# Patient Record
Sex: FEMALE | Race: WHITE | Employment: PART TIME | URBAN - METROPOLITAN AREA
[De-identification: names, ages, dates, MRNs, and addresses within clinical notes are randomized per-mention and may not be internally consistent; named-entity substitution may affect disease eponyms.]

---

## 2017-06-10 ENCOUNTER — HOSPITAL ENCOUNTER (EMERGENCY)
Facility: CLINIC | Age: 22
Discharge: HOME | End: 2017-06-10
Attending: FAMILY MEDICINE | Admitting: FAMILY MEDICINE
Payer: COMMERCIAL

## 2017-06-10 VITALS
RESPIRATION RATE: 16 BRPM | TEMPERATURE: 97.9 F | BODY MASS INDEX: 24.32 KG/M2 | DIASTOLIC BLOOD PRESSURE: 78 MMHG | HEART RATE: 70 BPM | OXYGEN SATURATION: 99 % | WEIGHT: 145 LBS | SYSTOLIC BLOOD PRESSURE: 117 MMHG

## 2017-06-10 DIAGNOSIS — K20.80 ESOPHAGITIS DUE TO DOXYCYCLINE: ICD-10-CM

## 2017-06-10 DIAGNOSIS — T36.4X5A ESOPHAGITIS DUE TO DOXYCYCLINE: ICD-10-CM

## 2017-06-10 PROCEDURE — 99283 EMERGENCY DEPT VISIT LOW MDM: CPT

## 2017-06-10 PROCEDURE — 99283 EMERGENCY DEPT VISIT LOW MDM: CPT | Performed by: FAMILY MEDICINE

## 2017-06-10 NOTE — ED PROVIDER NOTES
"  History     Chief Complaint   Patient presents with     Gastrophageal Reflux     right sided chest pain and neck pain after eating and drinking. Started last night, and today. Worse with eating, no nausea or vomiting.      HPI  Anna Hosler is a 21 year old female who presents with burning discomfort that started awaiting radiating up to the neck.  It is aggravated by eating especially acidic foods.  It began about 2 days ago when she woke up in the morning.  The night before she had taken her last dose of doxycycline that she was taking for a \"cold.\"  She had swallowed the pill without liquids and immediately laid down to sleep.  She woke up with pain in the next morning.  She has not had a fever.  She has a little bit of a cough that she had had previously from her cold is getting better.  She's had no vomiting.  She has no abdominal pain.  No bowel or bladder dysfunction.  She doesn't smoke.  She does not consume alcohol.  Currently she otherwise takes no medications.    I have reviewed the Medications, Allergies, Past Medical and Surgical History, and Social History in the Epic system.    Allergies:   Allergies   Allergen Reactions     Amoxicillin Unknown     Neomycin-Polymyxin-Hc Swelling         No current facility-administered medications on file prior to encounter.   Current Outpatient Prescriptions on File Prior to Encounter:  polyethylene glycol (MIRALAX) powder Take 17 g by mouth daily       Patient Active Problem List   Diagnosis     Irritable bowel syndrome     Constipation, chronic       No past surgical history on file.    Social History   Substance Use Topics     Smoking status: Never Smoker     Smokeless tobacco: Never Used     Alcohol use No         There is no immunization history on file for this patient.    BMI: Estimated body mass index is 24.32 kg/(m^2) as calculated from the following:    Height as of 11/12/13: 1.645 m (5' 4.75\").    Weight as of this encounter: 65.8 kg (145 lb).      Review " of Systems  Further problem focused system review negative.    Physical Exam   BP: 117/78  Pulse: 70  Temp: 97.9  F (36.6  C)  Resp: 16  Weight: 65.8 kg (145 lb)  SpO2: 99 %  Physical Exam  Nursing note and vitals were reviewed.  Constitutional: Awake and alert, healthy appearing 21-year-old in no acute distress, who does not appear acutely ill, and who answers questions appropriately and cooperates with examination.  HEENT: EOMI. Oropharynx normal.  Neck: Freely mobile.  Cardiovascular: Cardiac examination reveals normal heart rate and regular rhythm without murmur.  Pulmonary/Chest: Breathing is unlabored.  Breath sounds are clear and equal bilaterally.  There no retractions, tachypnea, rales, wheezes, or rhonchi.  Abdomen: Soft, nontender, no HSM or masses rebound or guarding.  Neurological: Alert, oriented, thought content logical, coherent   Skin: Warm, dry, no rashes.  Psychiatric: Affect broad and appropriate.        ED Course     ED Course     Procedures             Critical Care time:  none               Labs Ordered and Resulted from Time of ED Arrival Up to the Time of Departure from the ED - No data to display    Assessments & Plan (with Medical Decision Making)     21-year-old presents with burning substernal discomfort in the setting of doxycycline use coming on the morning after swallowing the pill without liquids and laying down immediately.  Her history is consistent with doxycycline induced esophagitis.  Her physical examination is reassuring.  At this time I recommend treatment with proton pump inhibitors and observation.  If symptoms fail to resolve or new concerning symptoms develop, the workup can be broadened but her history is typical and I suspect she will have rapid improvement with avoidance of irritants and use of acid suppression.    I have reviewed the nursing notes.    I have reviewed the findings, diagnosis, plan and need for follow up with the patient.       New Prescriptions     OMEPRAZOLE (PRILOSEC) 20 MG CR CAPSULE    Take 1 capsule (20 mg) by mouth 2 times daily for 7 days       Final diagnoses:   Esophagitis due to doxycycline       6/10/2017   [facility] EMERGENCY DEPARTMENT     Thomas Tolentino MD  06/10/17 1236

## 2017-06-10 NOTE — DISCHARGE INSTRUCTIONS
Avoid acidic foods.. Avoid alcohol.  Take omeprazole 20 mg twice daily for up to 7 days until symptoms resolve.  Return to be seen if not improved in 12-24 hours or if new or worsening symptoms including fevers or vomiting.

## 2017-06-10 NOTE — ED AVS SNAPSHOT
[facility] Emergency Department    [address] [address]    Phone:  [phone]    Fax:  [phone]                                       Anna Hosler   MRN: [id number]    Department:  [facility] Emergency Department   Date of Visit:  6/10/2017           After Visit Summary Signature Page     I have received my discharge instructions, and my questions have been answered. I have discussed any challenges I see with this plan with the nurse or doctor.    ..........................................................................................................................................  Patient/Patient Representative Signature      ..........................................................................................................................................  Patient Representative Print Name and Relationship to Patient    ..................................................               ................................................  Date                                            Time    ..........................................................................................................................................  Reviewed by Signature/Title    ...................................................              ..............................................  Date                                                            Time

## 2017-06-10 NOTE — ED AVS SNAPSHOT
[facility] Emergency Department    [address] [address]    Phone:  [phone]    Fax:  [phone]                                       Anna Hosler   MRN: [id number]    Department:  [facility] Emergency Department   Date of Visit:  6/10/2017           Patient Information     Date Of Birth          1995        Your diagnoses for this visit were:     Esophagitis due to doxycycline        You were seen by Thomas Tolentino MD.        Discharge Instructions       Avoid acidic foods.. Avoid alcohol.  Take omeprazole 20 mg twice daily for up to 7 days until symptoms resolve.  Return to be seen if not improved in 12-24 hours or if new or worsening symptoms including fevers or vomiting.    24 Hour Appointment Hotline       To make an appointment at any [facility] clinic, call [phone] ([phone]). If you don't have a family doctor or clinic, we will help you find one. [facility] clinics are conveniently located to serve the needs of you and your family.             Review of your medicines      START taking        Dose / Directions Last dose taken    omeprazole 20 MG CR capsule   Commonly known as:  priLOSEC   Dose:  20 mg   Quantity:  14 capsule        Take 1 capsule (20 mg) by mouth 2 times daily for 7 days   Refills:  0          Our records show that you are taking the medicines listed below. If these are incorrect, please call your family doctor or clinic.        Dose / Directions Last dose taken    polyethylene glycol powder   Commonly known as:  MIRALAX   Dose:  1 capful   Quantity:  119 g        Take 17 g by mouth daily   Refills:  3                Prescriptions were sent or printed at these locations (1 Prescription)                   [facility] [address] [place], [place] [address] [facility] AT [address]   [address] [address]    Telephone:  [phone]   Fax:  [phone]   Hours:                  E-Prescribed (1 of 1)  "        omeprazole (PRILOSEC) 20 MG CR capsule                Orders Needing Specimen Collection     None      Pending Results     No orders found from 2017 to 2017.            Pending Culture Results     No orders found from 2017 to 2017.            Pending Results Instructions     If you had any lab results that were not finalized at the time of your Discharge, you can call the ED Lab Result RN at [phone]. You will be contacted by this team for any positive Lab results or changes in treatment. The nurses are available 7 days a week from 10A to 6:30P.  You can leave a message 24 hours per day and they will return your call.        Test Results From Your Hospital Stay               Thank you for choosing [facility]       Thank you for choosing [facility] for your care. Our goal is always to provide you with excellent care. Hearing back from our patients is one way we can continue to improve our services. Please take a few minutes to complete the written survey that you may receive in the mail after you visit with us. Thank you!        [employer] Information     [employer] lets you send messages to your doctor, view your test results, renew your prescriptions, schedule appointments and more. To sign up, go to [email or web address] . Click on \"Log in\" on the left side of the screen, which will take you to the Welcome page. Then click on \"Sign up Now\" on the right side of the page.     You will be asked to enter the access code listed below, as well as some personal information. Please follow the directions to create your username and password.     Your access code is: [email or web address]  Expires: 2017  1:18 PM     Your access code will  in 90 days. If you need help or a new code, please call your [facility] clinic or [phone].        Care EveryWhere ID     This is your Care EveryWhere ID. This could be used by other organizations to access your [facility] medical records  [id number]        After " Visit Summary       This is your record. Keep this with you and show to your community pharmacist(s) and doctor(s) at your next visit.

## 2017-11-20 ENCOUNTER — APPOINTMENT (OUTPATIENT)
Dept: ULTRASOUND IMAGING | Facility: CLINIC | Age: 22
End: 2017-11-20
Attending: EMERGENCY MEDICINE
Payer: COMMERCIAL

## 2017-11-20 ENCOUNTER — HOSPITAL ENCOUNTER (EMERGENCY)
Facility: CLINIC | Age: 22
Discharge: HOME | End: 2017-11-20
Attending: EMERGENCY MEDICINE | Admitting: EMERGENCY MEDICINE
Payer: COMMERCIAL

## 2017-11-20 VITALS
SYSTOLIC BLOOD PRESSURE: 111 MMHG | TEMPERATURE: 99.6 F | RESPIRATION RATE: 16 BRPM | DIASTOLIC BLOOD PRESSURE: 78 MMHG | OXYGEN SATURATION: 98 % | HEART RATE: 72 BPM

## 2017-11-20 DIAGNOSIS — R10.9 FLANK PAIN: ICD-10-CM

## 2017-11-20 LAB
ALBUMIN UR-MCNC: NEGATIVE MG/DL
ANION GAP SERPL CALCULATED.3IONS-SCNC: 10 MMOL/L (ref 3–14)
APPEARANCE UR: CLEAR
BACTERIA #/AREA URNS HPF: ABNORMAL /HPF
BASOPHILS # BLD AUTO: 0 10E9/L (ref 0–0.2)
BASOPHILS NFR BLD AUTO: 0.1 %
BILIRUB UR QL STRIP: NEGATIVE
BUN SERPL-MCNC: 7 MG/DL (ref 7–30)
CALCIUM SERPL-MCNC: 8.9 MG/DL (ref 8.5–10.1)
CHLORIDE SERPL-SCNC: 108 MMOL/L (ref 94–109)
CO2 SERPL-SCNC: 22 MMOL/L (ref 20–32)
COLOR UR AUTO: ABNORMAL
CREAT SERPL-MCNC: 0.5 MG/DL (ref 0.52–1.04)
DIFFERENTIAL METHOD BLD: NORMAL
EOSINOPHIL # BLD AUTO: 0.1 10E9/L (ref 0–0.7)
EOSINOPHIL NFR BLD AUTO: 1 %
ERYTHROCYTE [DISTWIDTH] IN BLOOD BY AUTOMATED COUNT: 13.7 % (ref 10–15)
GFR SERPL CREATININE-BSD FRML MDRD: >90 ML/MIN/1.7M2
GLUCOSE SERPL-MCNC: 95 MG/DL (ref 70–99)
GLUCOSE UR STRIP-MCNC: NEGATIVE MG/DL
HCT VFR BLD AUTO: 35.9 % (ref 35–47)
HGB BLD-MCNC: 12.2 G/DL (ref 11.7–15.7)
HGB UR QL STRIP: NEGATIVE
IMM GRANULOCYTES # BLD: 0 10E9/L (ref 0–0.4)
IMM GRANULOCYTES NFR BLD: 0.2 %
KETONES UR STRIP-MCNC: NEGATIVE MG/DL
LEUKOCYTE ESTERASE UR QL STRIP: NEGATIVE
LYMPHOCYTES # BLD AUTO: 1.6 10E9/L (ref 0.8–5.3)
LYMPHOCYTES NFR BLD AUTO: 18.5 %
MCH RBC QN AUTO: 31.9 PG (ref 26.5–33)
MCHC RBC AUTO-ENTMCNC: 34 G/DL (ref 31.5–36.5)
MCV RBC AUTO: 94 FL (ref 78–100)
MONOCYTES # BLD AUTO: 0.6 10E9/L (ref 0–1.3)
MONOCYTES NFR BLD AUTO: 6.4 %
MUCOUS THREADS #/AREA URNS LPF: PRESENT /LPF
NEUTROPHILS # BLD AUTO: 6.4 10E9/L (ref 1.6–8.3)
NEUTROPHILS NFR BLD AUTO: 73.8 %
NITRATE UR QL: NEGATIVE
PH UR STRIP: 6.5 PH (ref 5–7)
PLATELET # BLD AUTO: 151 10E9/L (ref 150–450)
POTASSIUM SERPL-SCNC: 3.4 MMOL/L (ref 3.4–5.3)
RBC # BLD AUTO: 3.82 10E12/L (ref 3.8–5.2)
RBC #/AREA URNS AUTO: 1 /HPF (ref 0–2)
SODIUM SERPL-SCNC: 140 MMOL/L (ref 133–144)
SOURCE: ABNORMAL
SP GR UR STRIP: 1 (ref 1–1.03)
UROBILINOGEN UR STRIP-MCNC: NORMAL MG/DL (ref 0–2)
WBC # BLD AUTO: 8.7 10E9/L (ref 4–11)
WBC #/AREA URNS AUTO: 1 /HPF (ref 0–2)

## 2017-11-20 PROCEDURE — 80048 BASIC METABOLIC PNL TOTAL CA: CPT | Performed by: EMERGENCY MEDICINE

## 2017-11-20 PROCEDURE — 36415 COLL VENOUS BLD VENIPUNCTURE: CPT

## 2017-11-20 PROCEDURE — 76770 US EXAM ABDO BACK WALL COMP: CPT

## 2017-11-20 PROCEDURE — 99284 EMERGENCY DEPT VISIT MOD MDM: CPT | Mod: 25

## 2017-11-20 PROCEDURE — 99284 EMERGENCY DEPT VISIT MOD MDM: CPT | Mod: Z6 | Performed by: EMERGENCY MEDICINE

## 2017-11-20 PROCEDURE — 85025 COMPLETE CBC W/AUTO DIFF WBC: CPT | Performed by: EMERGENCY MEDICINE

## 2017-11-20 PROCEDURE — 81001 URINALYSIS AUTO W/SCOPE: CPT | Performed by: EMERGENCY MEDICINE

## 2017-11-20 RX ORDER — ONDANSETRON 4 MG/1
4 TABLET, ORALLY DISINTEGRATING ORAL EVERY 6 HOURS PRN
Qty: 10 TABLET | Refills: 0 | Status: SHIPPED | OUTPATIENT
Start: 2017-11-20 | End: 2017-11-23

## 2017-11-20 NOTE — ED AVS SNAPSHOT
[facility] Emergency Department    [address] [facility] [address]    Phone:  [phone]    Fax:  [phone]                                       Anna Hosler   MRN: [id number]    Department:  [facility] Emergency Department   Date of Visit:  11/20/2017           After Visit Summary Signature Page     I have received my discharge instructions, and my questions have been answered. I have discussed any challenges I see with this plan with the nurse or doctor.    ..........................................................................................................................................  Patient/Patient Representative Signature      ..........................................................................................................................................  Patient Representative Print Name and Relationship to Patient    ..................................................               ................................................  Date                                            Time    ..........................................................................................................................................  Reviewed by Signature/Title    ...................................................              ..............................................  Date                                                            Time

## 2017-11-20 NOTE — ED NOTES
Pt was rolling over this morning in bed and got a sharp, shooting pain in her mid back. Pt states that the pain went away after voiding but gradually builds back up until she voids again. Pt states there is also some burning with urination. Pt denies nausea, vomiting or diarrhea.

## 2017-11-20 NOTE — ED AVS SNAPSHOT
[facility] Emergency Department    [address] [address]    Phone:  [phone]    Fax:  [phone]                                       Anna Hosler   MRN: [id number]    Department:  [facility] Emergency Department   Date of Visit:  11/20/2017           Patient Information     Date Of Birth          1995        Your diagnoses for this visit were:     Flank pain        You were seen by Andre Pickens MD.      Follow-up Information     Follow up with [facility].    Specialty:  Urology    Contact information:    [facility]  [facility] [address] [phone]    Additional information:    The medical center is located at   [address] (between [address] in [place], [place] miles north   of [place]).        Discharge Instructions         Flank Pain, Uncertain Cause  The flank is the area between your upper abdomen and your back. Pain there is often caused by a problem with your kidneys. It might be a kidney infection or a kidney stone. Other causes of flank pain include spinal arthritis, a pinched nerve from a back injury, or a back muscle strain or spasm.  The cause of your flank pain is not certain. You may need other tests.  Home care  Follow these tips when caring for yourself at home:    You may use acetaminophen or ibuprofen to control pain, unless your health care provider prescribed another medicine. If you have chronic liver or kidney disease, talk with your provider before taking these medicines. Also talk with your provider first if you ve ever had a stomach ulcer or GI bleeding.    If the pain is coming from your muscles, you may get relief with ice or heat. During the first 2 days after the injury, put an ice pack on the painful area for 20 minutes every 2 to 4 hours. This will reduce swelling and pain. A hot shower, hot bath, or heating pad works well for a muscle spasm. You can start with ice, then switch to  heat after 2 days. You might find that alternating ice and heat works well. Use the method that feels the best to you.  Follow-up care  Follow up with your healthcare provider if your symptoms don t get better over the next few days.  When to seek medical advice  Call your healthcare provider right away if any of these happen:    Repeated vomiting    Fever of 100.4 F (38 C) or higher, or as directed by your health care provider    Flank pain that gets worse    Pain that spreads to the front of your belly (abdomen)    Dizziness, weakness, or fainting    Blood in your urine    Burning feeling when you urinate or the need to urinate often    Pain in one of your legs that gets worse    Numbness or weakness in a leg  Date Last Reviewed: 10/1/2016    1786-8905 [employer]. [address] [place] [address], [place] [address]. All rights reserved. This information is not intended as a substitute for professional medical care. Always follow your healthcare professional's instructions.          Discharge References/Attachments     KIDNEY STONES, TREATING: EXPECTANT THERAPY (ENGLISH)      24 Hour Appointment Hotline       To make an appointment at any [facility], call [phone]). If you don't have a family doctor or clinic, we will help you find one. [facility] clinics are conveniently located to serve the needs of you and your family.             Review of your medicines      START taking        Dose / Directions Last dose taken    ondansetron 4 MG ODT tab   Commonly known as:  ZOFRAN ODT   Dose:  4 mg   Quantity:  10 tablet        Take 1 tablet (4 mg) by mouth every 6 hours as needed for nausea   Refills:  0          Our records show that you are taking the medicines listed below. If these are incorrect, please call your family doctor or clinic.        Dose / Directions Last dose taken    polyethylene glycol powder   Commonly known as:  MIRALAX   Dose:  1 capful   Quantity:  119 g        Take 17 g  by mouth daily   Refills:  3                Prescriptions were sent or printed at these locations (1 Prescription)                   Other Prescriptions                Printed at Department/Unit printer (1 of 1)         ondansetron (ZOFRAN ODT) 4 MG ODT tab                Procedures and tests performed during your visit     Basic metabolic panel    CBC with platelets, differential    Retroperitoneal US    UA with Microscopic reflex to Culture      Orders Needing Specimen Collection     None      Pending Results     Date and Time Order Name Status Description    11/20/2017 1737 Retroperitoneal US Preliminary             Pending Culture Results     No orders found from 11/18/2017 to 11/21/2017.            Pending Results Instructions     If you had any lab results that were not finalized at the time of your Discharge, you can call the ED Lab Result RN at [phone]. You will be contacted by this team for any positive Lab results or changes in treatment. The nurses are available 7 days a week from 10A to 6:30P.  You can leave a message 24 hours per day and they will return your call.        Test Results From Your Hospital Stay        11/20/2017  4:54 PM      Component Results     Component Value Ref Range & Units Status    Color Urine Straw  Final    Appearance Urine Clear  Final    Glucose Urine Negative NEG^Negative mg/dL Final    Bilirubin Urine Negative NEG^Negative Final    Ketones Urine Negative NEG^Negative mg/dL Final    Specific Gravity Urine 1.003 1.003 - 1.035 Final    Blood Urine Negative NEG^Negative Final    pH Urine 6.5 5.0 - 7.0 pH Final    Protein Albumin Urine Negative NEG^Negative mg/dL Final    Urobilinogen mg/dL Normal 0.0 - 2.0 mg/dL Final    Nitrite Urine Negative NEG^Negative Final    Leukocyte Esterase Urine Negative NEG^Negative Final    Source Midstream Urine  Final    WBC Urine 1 0 - 2 /HPF Final    RBC Urine 1 0 - 2 /HPF Final    Bacteria Urine Few (A) NEG^Negative /HPF Final    Mucous  Urine Present (A) NEG^Negative /LPF Final         11/20/2017  5:29 PM      Component Results     Component Value Ref Range & Units Status    WBC 8.7 4.0 - 11.0 10e9/L Final    RBC Count 3.82 3.8 - 5.2 10e12/L Final    Hemoglobin 12.2 11.7 - 15.7 g/dL Final    Hematocrit 35.9 35.0 - 47.0 % Final    MCV 94 78 - 100 fl Final    MCH 31.9 26.5 - 33.0 pg Final    MCHC 34.0 31.5 - 36.5 g/dL Final    RDW 13.7 10.0 - 15.0 % Final    Platelet Count 151 150 - 450 10e9/L Final    Diff Method Automated Method  Final    % Neutrophils 73.8 % Final    % Lymphocytes 18.5 % Final    % Monocytes 6.4 % Final    % Eosinophils 1.0 % Final    % Basophils 0.1 % Final    % Immature Granulocytes 0.2 % Final    Absolute Neutrophil 6.4 1.6 - 8.3 10e9/L Final    Absolute Lymphocytes 1.6 0.8 - 5.3 10e9/L Final    Absolute Monocytes 0.6 0.0 - 1.3 10e9/L Final    Absolute Eosinophils 0.1 0.0 - 0.7 10e9/L Final    Absolute Basophils 0.0 0.0 - 0.2 10e9/L Final    Abs Immature Granulocytes 0.0 0 - 0.4 10e9/L Final         11/20/2017  5:44 PM      Component Results     Component Value Ref Range & Units Status    Sodium 140 133 - 144 mmol/L Final    Potassium 3.4 3.4 - 5.3 mmol/L Final    Chloride 108 94 - 109 mmol/L Final    Carbon Dioxide 22 20 - 32 mmol/L Final    Anion Gap 10 3 - 14 mmol/L Final    Glucose 95 70 - 99 mg/dL Final    Urea Nitrogen 7 7 - 30 mg/dL Final    Creatinine 0.50 (L) 0.52 - 1.04 mg/dL Final    GFR Estimate >90 >60 mL/min/1.7m2 Final    Non  GFR Calc    GFR Estimate If Black >90 >60 mL/min/1.7m2 Final    African American GFR Calc    Calcium 8.9 8.5 - 10.1 mg/dL Final         11/20/2017  6:36 PM      Narrative     ULTRASOUND RETROPERITONEAL COMPLETE   11/20/2017  6:30 PM     HISTORY: 18 weeks pregnant. Left-sided flank pain with urinary  frequency.    COMPARISON: None.    FINDINGS: The right and left kidneys have normal size and  echogenicity, measuring 11.5 and 12.4 cm in length respectively. Mild  to moderate  "dilatation of the left intrarenal collecting system. No  dilatation of the right intrarenal collecting system. No renal masses  or calculi are visualized bilaterally. The urinary bladder contains a  small amount of urine and is otherwise unremarkable. Partial  visualization of a gravid uterus.        Impression     IMPRESSION:   1. Mild to moderate dilatation of the left intrarenal collecting  system.  2. Unremarkable appearance of the right kidney.                Thank you for choosing [facility]       Thank you for choosing [facility] for your care. Our goal is always to provide you with excellent care. Hearing back from our patients is one way we can continue to improve our services. Please take a few minutes to complete the written survey that you may receive in the mail after you visit with us. Thank you!        [employer] Information     [employer] lets you send messages to your doctor, view your test results, renew your prescriptions, schedule appointments and more. To sign up, go to [email or web address] . Click on \"Log in\" on the left side of the screen, which will take you to the Welcome page. Then click on \"Sign up Now\" on the right side of the page.     You will be asked to enter the access code listed below, as well as some personal information. Please follow the directions to create your username and password.     Your access code is: [email or web address]  Expires: 2018  6:50 PM     Your access code will  in 90 days. If you need help or a new code, please call your [facility] clinic or [phone].        Care EveryWhere ID     This is your Care EveryWhere ID. This could be used by other organizations to access your [facility] medical records  [id number]        Equal Access to Services     [facility] HospitalSTACIE : Brenda Will, washeila dugan, qaybta kalane dailey . So [facility] [phone].    ATENCIÓN: Si habla español, tiene a ludwig disposición servicios " nash de asistencia lingüística. Tai carrizales [phone].    We comply with applicable federal civil rights laws and [place] laws. We do not discriminate on the basis of race, color, national origin, age, disability, sex, sexual orientation, or gender identity.            After Visit Summary       This is your record. Keep this with you and show to your community pharmacist(s) and doctor(s) at your next visit.

## 2017-11-20 NOTE — ED PROVIDER NOTES
History     Chief Complaint   Patient presents with     Back Pain     had 2 episodes of back pain today, both relieved by going to the bathroom     HPI  Anna Hosler is a 22 year old female who has a history of chronic constipation and IBS who presents to the ED for evaluation for back pain. Patient is , and she is currently at 18 weeks gestation. Patient reports that when she woke up this morning and rolled over, she had pain in her lower back radiating bilaterally into the flanks. Patient urinated, and this pain was alleviated. This continued when she was at work. She noticed that when she would drink fluids and fills her bladder, this back pain would return. Again, it was alleviated by urinating. Patient notes that she is able to reproduced the pain with twisting of the torso, left greater than right. Patient has not had this before. Patient's has had normal bowel movements.     Patient has not changed any daily activities. She does work out, but this has not changed. She does have vaginal discharge that has been constant and unchanged throughout her pregnancy. Patient's daily prescriptions include prenatal vitamins and clindamycin 1% topical ointment for skin care. She uses fluticasone nasal spray and budesonide nasal spray PRN. Patient has no personal history of kidney stones. She denies fever, nausea, coughing, and recent illness.     Problem List:    Patient Active Problem List    Diagnosis Date Noted     Irritable bowel syndrome 2013     Priority: Medium     Constipation, chronic 2013     Priority: Medium        Past Medical History:    No past medical history on file.    Past Surgical History:    No past surgical history on file.    Family History:    No family history on file.    Social History:  Marital Status:  Single [1]  Social History   Substance Use Topics     Smoking status: Never Smoker     Smokeless tobacco: Never Used     Alcohol use No        Medications:      ondansetron  (ZOFRAN ODT) 4 MG ODT tab   polyethylene glycol (MIRALAX) powder         Review of Systems  All other systems are reviewed and are negative.    Physical Exam   BP: 123/84  Pulse: 73  Temp: 99.6  F (37.6  C)  Resp: 18  SpO2: 98 %      Physical Exam  Nontoxic appearing no respiratory distress alert and oriented ×3  Head atraumatic normocephalic  Conjunctiva noninjected, oropharynx moist without lesions or erythema  No cervical adenopathy neck supple full active painless range of motion  Lungs clear to auscultation  Heart regular no murmur  Mild left paralumbar/left CVA tenderness no associated rash  Abdomen soft nontender bowel sounds positive no masses or HSM  Strength and sensation grossly intact throughout the extremities, gait and station normal  Speech is fluent, good eye contact, thought processes are rational    ED Course     ED Course     Procedures               Critical Care time:  none               Labs Ordered and Resulted from Time of ED Arrival Up to the Time of Departure from the ED   ROUTINE UA WITH MICROSCOPIC REFLEX TO CULTURE - Abnormal; Notable for the following:        Result Value    Bacteria Urine Few (*)     Mucous Urine Present (*)     All other components within normal limits   BASIC METABOLIC PANEL - Abnormal; Notable for the following:     Creatinine 0.50 (*)     All other components within normal limits   CBC WITH PLATELETS DIFFERENTIAL     Results for orders placed or performed during the hospital encounter of 11/20/17 (from the past 24 hour(s))   UA with Microscopic reflex to Culture   Result Value Ref Range    Color Urine Straw     Appearance Urine Clear     Glucose Urine Negative NEG^Negative mg/dL    Bilirubin Urine Negative NEG^Negative    Ketones Urine Negative NEG^Negative mg/dL    Specific Gravity Urine 1.003 1.003 - 1.035    Blood Urine Negative NEG^Negative    pH Urine 6.5 5.0 - 7.0 pH    Protein Albumin Urine Negative NEG^Negative mg/dL    Urobilinogen mg/dL Normal 0.0 - 2.0  mg/dL    Nitrite Urine Negative NEG^Negative    Leukocyte Esterase Urine Negative NEG^Negative    Source Midstream Urine     WBC Urine 1 0 - 2 /HPF    RBC Urine 1 0 - 2 /HPF    Bacteria Urine Few (A) NEG^Negative /HPF    Mucous Urine Present (A) NEG^Negative /LPF   CBC with platelets, differential   Result Value Ref Range    WBC 8.7 4.0 - 11.0 10e9/L    RBC Count 3.82 3.8 - 5.2 10e12/L    Hemoglobin 12.2 11.7 - 15.7 g/dL    Hematocrit 35.9 35.0 - 47.0 %    MCV 94 78 - 100 fl    MCH 31.9 26.5 - 33.0 pg    MCHC 34.0 31.5 - 36.5 g/dL    RDW 13.7 10.0 - 15.0 %    Platelet Count 151 150 - 450 10e9/L    Diff Method Automated Method     % Neutrophils 73.8 %    % Lymphocytes 18.5 %    % Monocytes 6.4 %    % Eosinophils 1.0 %    % Basophils 0.1 %    % Immature Granulocytes 0.2 %    Absolute Neutrophil 6.4 1.6 - 8.3 10e9/L    Absolute Lymphocytes 1.6 0.8 - 5.3 10e9/L    Absolute Monocytes 0.6 0.0 - 1.3 10e9/L    Absolute Eosinophils 0.1 0.0 - 0.7 10e9/L    Absolute Basophils 0.0 0.0 - 0.2 10e9/L    Abs Immature Granulocytes 0.0 0 - 0.4 10e9/L   Basic metabolic panel   Result Value Ref Range    Sodium 140 133 - 144 mmol/L    Potassium 3.4 3.4 - 5.3 mmol/L    Chloride 108 94 - 109 mmol/L    Carbon Dioxide 22 20 - 32 mmol/L    Anion Gap 10 3 - 14 mmol/L    Glucose 95 70 - 99 mg/dL    Urea Nitrogen 7 7 - 30 mg/dL    Creatinine 0.50 (L) 0.52 - 1.04 mg/dL    GFR Estimate >90 >60 mL/min/1.7m2    GFR Estimate If Black >90 >60 mL/min/1.7m2    Calcium 8.9 8.5 - 10.1 mg/dL   Retroperitoneal US    Narrative    ULTRASOUND RETROPERITONEAL COMPLETE   11/20/2017  6:30 PM     HISTORY: 18 weeks pregnant. Left-sided flank pain with urinary  frequency.    COMPARISON: None.    FINDINGS: The right and left kidneys have normal size and  echogenicity, measuring 11.5 and 12.4 cm in length respectively. Mild  to moderate dilatation of the left intrarenal collecting system. No  dilatation of the right intrarenal collecting system. No renal masses  or  calculi are visualized bilaterally. The urinary bladder contains a  small amount of urine and is otherwise unremarkable. Partial  visualization of a gravid uterus.      Impression    IMPRESSION:   1. Mild to moderate dilatation of the left intrarenal collecting  system.  2. Unremarkable appearance of the right kidney.       Medications - No data to display    4:34 PM Patient assessed.    Assessments & Plan (with Medical Decision Making)  22-year-old female  presents with back pains, left flank pain, associated urinary frequency, urinalysis unremarkable, retroperitoneal ultrasound significant for mild to moderate dilation left intrarenal collecting system.  No renal masses or calculi are visualized.  Remainder of workup is unremarkable.  Findings on history, exam and ultrasound consistent with ureterolithiasis.  Watchful waiting appropriate.  No evidence for urinary tract infection.  Tylenol for discomfort, Zofran for nausea, drink plenty of fluids, follow-up will be tomorrow as scheduled.  Return here for fever, vomiting, worsening pain or any other concern.       I have reviewed the nursing notes.    I have reviewed the findings, diagnosis, plan and need for follow up with the patient.       Discharge Medication List as of 2017  6:50 PM      START taking these medications    Details   ondansetron (ZOFRAN ODT) 4 MG ODT tab Take 1 tablet (4 mg) by mouth every 6 hours as needed for nausea, Disp-10 tablet, R-0, Local Print             Final diagnoses:   Flank pain     This document serves as a record of the services and decisions personally performed and made by Andre Pickens MD. It was created on HIS/HER behalf by   Conchita Jackson, a trained medical scribe. The creation of this document is based the provider's statements to the medical scribe.  Conchita Jackson 4:34 PM 2017    Provider:   The information in this document, created by the medical scribe for me, accurately reflects the services I  personally performed and the decisions made by me. I have reviewed and approved this document for accuracy prior to leaving the patient care area.  Andre Pickens MD 4:34 PM 11/20/2017 11/20/2017   [facility] EMERGENCY DEPARTMENT     Andre Pickens MD  11/20/17 1730

## 2017-11-21 NOTE — DISCHARGE INSTRUCTIONS
Flank Pain, Uncertain Cause  The flank is the area between your upper abdomen and your back. Pain there is often caused by a problem with your kidneys. It might be a kidney infection or a kidney stone. Other causes of flank pain include spinal arthritis, a pinched nerve from a back injury, or a back muscle strain or spasm.  The cause of your flank pain is not certain. You may need other tests.  Home care  Follow these tips when caring for yourself at home:    You may use acetaminophen or ibuprofen to control pain, unless your health care provider prescribed another medicine. If you have chronic liver or kidney disease, talk with your provider before taking these medicines. Also talk with your provider first if you ve ever had a stomach ulcer or GI bleeding.    If the pain is coming from your muscles, you may get relief with ice or heat. During the first 2 days after the injury, put an ice pack on the painful area for 20 minutes every 2 to 4 hours. This will reduce swelling and pain. A hot shower, hot bath, or heating pad works well for a muscle spasm. You can start with ice, then switch to heat after 2 days. You might find that alternating ice and heat works well. Use the method that feels the best to you.  Follow-up care  Follow up with your healthcare provider if your symptoms don t get better over the next few days.  When to seek medical advice  Call your healthcare provider right away if any of these happen:    Repeated vomiting    Fever of 100.4 F (38 C) or higher, or as directed by your health care provider    Flank pain that gets worse    Pain that spreads to the front of your belly (abdomen)    Dizziness, weakness, or fainting    Blood in your urine    Burning feeling when you urinate or the need to urinate often    Pain in one of your legs that gets worse    Numbness or weakness in a leg  Date Last Reviewed: 10/1/2016    8747-3220 [employer] [address] [facility], [place] [address]. All  rights reserved. This information is not intended as a substitute for professional medical care. Always follow your healthcare professional's instructions.

## 2018-02-02 ENCOUNTER — APPOINTMENT (OUTPATIENT)
Dept: ULTRASOUND IMAGING | Facility: CLINIC | Age: 23
End: 2018-02-02
Attending: FAMILY MEDICINE
Payer: COMMERCIAL

## 2018-02-02 ENCOUNTER — HOSPITAL ENCOUNTER (OUTPATIENT)
Facility: CLINIC | Age: 23
Discharge: HOME | End: 2018-02-02
Attending: FAMILY MEDICINE | Admitting: FAMILY MEDICINE
Payer: COMMERCIAL

## 2018-02-02 VITALS
RESPIRATION RATE: 20 BRPM | HEART RATE: 84 BPM | DIASTOLIC BLOOD PRESSURE: 70 MMHG | OXYGEN SATURATION: 97 % | SYSTOLIC BLOOD PRESSURE: 107 MMHG | WEIGHT: 155 LBS | TEMPERATURE: 97.8 F | BODY MASS INDEX: 25.99 KG/M2

## 2018-02-02 VITALS — DIASTOLIC BLOOD PRESSURE: 69 MMHG | TEMPERATURE: 98.3 F | RESPIRATION RATE: 16 BRPM | SYSTOLIC BLOOD PRESSURE: 130 MMHG

## 2018-02-02 DIAGNOSIS — N13.30 BILATERAL HYDRONEPHROSIS: ICD-10-CM

## 2018-02-02 DIAGNOSIS — R10.9 ABDOMINAL PAIN IN PREGNANCY, ANTEPARTUM: ICD-10-CM

## 2018-02-02 DIAGNOSIS — O26.899 ABDOMINAL PAIN IN PREGNANCY, ANTEPARTUM: ICD-10-CM

## 2018-02-02 PROBLEM — Z36.89 ENCOUNTER FOR TRIAGE IN PREGNANT PATIENT: Status: ACTIVE | Noted: 2018-02-02

## 2018-02-02 LAB
ALBUMIN UR-MCNC: NEGATIVE MG/DL
ANION GAP SERPL CALCULATED.3IONS-SCNC: 9 MMOL/L (ref 3–14)
APPEARANCE UR: CLEAR
BASOPHILS # BLD AUTO: 0 10E9/L (ref 0–0.2)
BASOPHILS NFR BLD AUTO: 0.2 %
BILIRUB UR QL STRIP: NEGATIVE
BUN SERPL-MCNC: 9 MG/DL (ref 7–30)
CALCIUM SERPL-MCNC: 8.3 MG/DL (ref 8.5–10.1)
CHLORIDE SERPL-SCNC: 107 MMOL/L (ref 94–109)
CO2 SERPL-SCNC: 24 MMOL/L (ref 20–32)
COLOR UR AUTO: YELLOW
CREAT SERPL-MCNC: 0.4 MG/DL (ref 0.52–1.04)
DIFFERENTIAL METHOD BLD: ABNORMAL
EOSINOPHIL # BLD AUTO: 0.1 10E9/L (ref 0–0.7)
EOSINOPHIL NFR BLD AUTO: 1.2 %
ERYTHROCYTE [DISTWIDTH] IN BLOOD BY AUTOMATED COUNT: 14 % (ref 10–15)
GFR SERPL CREATININE-BSD FRML MDRD: >90 ML/MIN/1.7M2
GLUCOSE SERPL-MCNC: 89 MG/DL (ref 70–99)
GLUCOSE UR STRIP-MCNC: NEGATIVE MG/DL
HCT VFR BLD AUTO: 34.5 % (ref 35–47)
HGB BLD-MCNC: 11.8 G/DL (ref 11.7–15.7)
HGB UR QL STRIP: NEGATIVE
IMM GRANULOCYTES # BLD: 0 10E9/L (ref 0–0.4)
IMM GRANULOCYTES NFR BLD: 0.2 %
KETONES UR STRIP-MCNC: NEGATIVE MG/DL
LEUKOCYTE ESTERASE UR QL STRIP: NEGATIVE
LYMPHOCYTES # BLD AUTO: 1.8 10E9/L (ref 0.8–5.3)
LYMPHOCYTES NFR BLD AUTO: 19.7 %
MCH RBC QN AUTO: 32.9 PG (ref 26.5–33)
MCHC RBC AUTO-ENTMCNC: 34.2 G/DL (ref 31.5–36.5)
MCV RBC AUTO: 96 FL (ref 78–100)
MONOCYTES # BLD AUTO: 0.7 10E9/L (ref 0–1.3)
MONOCYTES NFR BLD AUTO: 7.3 %
MUCOUS THREADS #/AREA URNS LPF: PRESENT /LPF
NEUTROPHILS # BLD AUTO: 6.6 10E9/L (ref 1.6–8.3)
NEUTROPHILS NFR BLD AUTO: 71.4 %
NITRATE UR QL: NEGATIVE
PH UR STRIP: 7 PH (ref 5–7)
PLATELET # BLD AUTO: 137 10E9/L (ref 150–450)
POTASSIUM SERPL-SCNC: 3.5 MMOL/L (ref 3.4–5.3)
RBC # BLD AUTO: 3.59 10E12/L (ref 3.8–5.2)
RBC #/AREA URNS AUTO: 0 /HPF (ref 0–2)
SODIUM SERPL-SCNC: 140 MMOL/L (ref 133–144)
SOURCE: ABNORMAL
SP GR UR STRIP: 1.01 (ref 1–1.03)
SQUAMOUS #/AREA URNS AUTO: 2 /HPF (ref 0–1)
UROBILINOGEN UR STRIP-MCNC: NORMAL MG/DL (ref 0–2)
WBC # BLD AUTO: 9.3 10E9/L (ref 4–11)
WBC #/AREA URNS AUTO: 4 /HPF (ref 0–2)

## 2018-02-02 PROCEDURE — 85025 COMPLETE CBC W/AUTO DIFF WBC: CPT | Performed by: FAMILY MEDICINE

## 2018-02-02 PROCEDURE — 25000128 H RX IP 250 OP 636: Performed by: FAMILY MEDICINE

## 2018-02-02 PROCEDURE — 99284 EMERGENCY DEPT VISIT MOD MDM: CPT | Mod: Z6 | Performed by: FAMILY MEDICINE

## 2018-02-02 PROCEDURE — G0463 HOSPITAL OUTPT CLINIC VISIT: HCPCS

## 2018-02-02 PROCEDURE — 76700 US EXAM ABDOM COMPLETE: CPT

## 2018-02-02 PROCEDURE — 96360 HYDRATION IV INFUSION INIT: CPT

## 2018-02-02 PROCEDURE — 99284 EMERGENCY DEPT VISIT MOD MDM: CPT | Mod: 25

## 2018-02-02 PROCEDURE — 80048 BASIC METABOLIC PNL TOTAL CA: CPT | Performed by: FAMILY MEDICINE

## 2018-02-02 PROCEDURE — 81001 URINALYSIS AUTO W/SCOPE: CPT | Performed by: FAMILY MEDICINE

## 2018-02-02 RX ORDER — PRENATAL VIT/IRON FUM/FOLIC AC 27MG-0.8MG
1 TABLET ORAL DAILY
COMMUNITY

## 2018-02-02 RX ADMIN — SODIUM CHLORIDE 1000 ML: 9 INJECTION, SOLUTION INTRAVENOUS at 17:54

## 2018-02-02 NOTE — ED PROVIDER NOTES
History     Chief Complaint   Patient presents with     Flank Pain     has kidney swelling, pt is 29 weeks gestation. from OB clinic.      HPI  Anna Hosler is a 22 year old female, approximately 29 week gestation, past medical history is significant for irritable bowel syndrome, chronic constipation, presents to the emergency department concerns of left lateral abdominal pain from OB clinic where she had a nonstress test which is reported as normal as well as urinalysis revealing 4 red cells and 2 squamous epithelial cells and without other abnormality for concerns of possible pyelonephritis.  History is obtained from the patient who states that she has had left lateral abdominal pain through most of her pregnancy but somewhat worse through the course of today causing her to seek care in the OB/GYN clinic.  She states that she is also had right lateral abdominal pain and was told that she has had swollen kidney on the right side on ultrasound by her primary care provider in clinic.  She has not tried any medication of any kind for this.  She denies urinary concerns such as frequency urgency or dysuria.  No recent change in bowel habit.  She denies the possibility trauma or injury.      Problem List:    Patient Active Problem List    Diagnosis Date Noted     Encounter for triage in pregnant patient 02/02/2018     Priority: Medium     Irritable bowel syndrome 09/17/2013     Priority: Medium     Constipation, chronic 09/17/2013     Priority: Medium        Past Medical History:    No past medical history on file.    Past Surgical History:    No past surgical history on file.    Family History:    No family history on file.    Social History:  Marital Status:  Single [1]  Social History   Substance Use Topics     Smoking status: Never Smoker     Smokeless tobacco: Never Used     Alcohol use No        Medications:      Prenatal Vit-Fe Fumarate-FA (PRENATAL MULTIVITAMIN PLUS IRON) 27-0.8 MG TABS per tablet    Acetaminophen (TYLENOL PO)   budesonide (RINOCORT AQUA) 32 MCG/ACT spray         Review of Systems   All other systems reviewed and are negative.      Physical Exam   BP: 111/62  Pulse: 84  Temp: 97.8  F (36.6  C)  Resp: 20  Weight: 70.3 kg (155 lb)  SpO2: 97 %      Physical Exam   Constitutional: She is oriented to person, place, and time. She appears well-developed and well-nourished.   The patient appears calm, comfortable, non-ill and nontoxic in appearance   HENT:   Head: Normocephalic and atraumatic.   Right Ear: External ear normal.   Left Ear: External ear normal.   Nose: Nose normal.   Mouth/Throat: Oropharynx is clear and moist.   Eyes: Conjunctivae and EOM are normal. Pupils are equal, round, and reactive to light.   Neck: Normal range of motion. Neck supple.   Cardiovascular: Normal rate, regular rhythm, normal heart sounds and intact distal pulses.    Pulmonary/Chest: Effort normal and breath sounds normal.   Abdominal: Soft. Bowel sounds are normal.       Mild tenderness to palpation with soft uterus on the left side.  Equivocal left CVA tenderness   Musculoskeletal: Normal range of motion.   Neurological: She is alert and oriented to person, place, and time.   Skin: Skin is warm and dry.   Psychiatric: She has a normal mood and affect. Her behavior is normal.   Nursing note and vitals reviewed.      ED Course     ED Course     Procedures             Results for orders placed or performed during the hospital encounter of 02/02/18   Abdomen US, complete    Narrative    COMPLETE ABDOMEN ULTRASOUND  2/2/2018  6:32 PM     HISTORY: Bilateral mid abdominal pain left worse than right. Pregnant  patient, same.    COMPARISON: Renal ultrasound 11/20/2017.    FINDINGS:   Gallbladder: Normal with no cholelithiasis, wall thickening or focal  tenderness.     Bile ducts: CHD is normal diameter. No intrahepatic biliary  dilatation.    Liver: Normal.     Pancreas: Partially obscured but grossly unremarkable.      Spleen: Normal.     Right kidney: Mild to moderate hydronephrosis. This is newly  identified.    Left kidney: Mild to moderate hydronephrosis. This is not  significantly changed.    Aorta and IVC: Not specifically assessed.      Impression    IMPRESSION: Mild to moderate bilateral hydronephrosis. This is stable  on the left, but newly identified on the right. Cause is uncertain.    PABLO YOUNGBLOOD MD         Critical Care time:  none               Labs Ordered and Resulted from Time of ED Arrival Up to the Time of Departure from the ED   CBC WITH PLATELETS DIFFERENTIAL - Abnormal; Notable for the following:        Result Value    RBC Count 3.59 (*)     Hematocrit 34.5 (*)     Platelet Count 137 (*)     All other components within normal limits   BASIC METABOLIC PANEL - Abnormal; Notable for the following:     Creatinine 0.40 (*)     Calcium 8.3 (*)     All other components within normal limits       Assessments & Plan (with Medical Decision Making)   22-year-old female at approximately 29 weeks gestation who presents from OB clinic with concerns of left lateral abdominal pain as discussed in the HPI.  Some diagnostic testing already performed up in OB/GYN and reviewed at the time of presentation.  The patient had some mild tenderness to palpation but definitely nonsurgical abdomen.  Uterus palpates as above.  Soft uterus with no tenderness.  Equivocal left CVA tenderness.  Ultrasound demonstrates mild to moderate bilateral hydronephrosis stable on the left but newly identified on the right.  Results are reviewed with the patient was comfortable.  Does not seem to be any evidence for infection current time.  She has normal renal function, is voiding normally.  No indication of infection on urinalysis obtained prior to visit.  I recommended observation only at this point, push fluids.  Follow-up in clinic with her OB/GYN.      Disclaimer: This note consists of symbols derived from keyboarding, dictation and/or voice  recognition software. As a result, there may be errors in the script that have gone undetected. Please consider this when interpreting information found in this chart.      I have reviewed the nursing notes.    I have reviewed the findings, diagnosis, plan and need for follow up with the patient.       Discharge Medication List as of 2/2/2018  7:59 PM          Final diagnoses:   Abdominal pain in pregnancy, antepartum   Bilateral hydronephrosis       2/2/2018   [facility] EMERGENCY DEPARTMENT     Mathew Myers MD  02/04/18 9501

## 2018-02-02 NOTE — ED AVS SNAPSHOT
[facility] Emergency Department    [address] [address]    Phone:  [phone]    Fax:  [phone]                                       Anna Hosler   MRN: [id number]    Department:  [facility] Emergency Department   Date of Visit:  2/2/2018           Patient Information     Date Of Birth          1995        Your diagnoses for this visit were:     Abdominal pain in pregnancy, antepartum     Bilateral hydronephrosis        You were seen by Mathew Myers MD.      Follow-up Information     Follow up with Clinic, [facility].    Contact information:    [facility] [facility] [id number] [phone]          Discharge Instructions       Push fluids, rest.  Follow-up in OB/GYN clinic as planned for prenatal care.  Return to the emergency department if worse or changes.      24 Hour Appointment Hotline       To make an appointment at any [facility] call [phone] ([phone]). If you don't have a family doctor or clinic, we will help you find one. [facility] are conveniently located to serve the needs of you and your family.             Review of your medicines      Our records show that you are taking the medicines listed below. If these are incorrect, please call your family doctor or clinic.        Dose / Directions Last dose taken    budesonide 32 MCG/ACT spray   Commonly known as:  RINOCORT AQUA   Dose:  1 spray        Spray 1 spray into both nostrils daily   Refills:  0        prenatal multivitamin plus iron 27-0.8 MG Tabs per tablet   Dose:  1 tablet        Take 1 tablet by mouth daily   Refills:  0        TYLENOL PO   Dose:  500 mg        Take 500 mg by mouth every 6 hours as needed for mild pain or fever   Refills:  0                Procedures and tests performed during your visit     Abdomen US, complete    Basic metabolic panel    CBC with platelets, differential      Orders Needing Specimen Collection     None      Pending  Results     Date and Time Order Name Status Description    2/2/2018 1738 Abdomen US, complete Preliminary             Pending Culture Results     No orders found from 1/31/2018 to 2/3/2018.            Pending Results Instructions     If you had any lab results that were not finalized at the time of your Discharge, you can call the ED Lab Result RN at [phone]. You will be contacted by this team for any positive Lab results or changes in treatment. The nurses are available 7 days a week from 10A to 6:30P.  You can leave a message 24 hours per day and they will return your call.        Test Results From Your Hospital Stay        2/2/2018  6:07 PM      Component Results     Component Value Ref Range & Units Status    WBC 9.3 4.0 - 11.0 10e9/L Final    RBC Count 3.59 (L) 3.8 - 5.2 10e12/L Final    Hemoglobin 11.8 11.7 - 15.7 g/dL Final    Hematocrit 34.5 (L) 35.0 - 47.0 % Final    MCV 96 78 - 100 fl Final    MCH 32.9 26.5 - 33.0 pg Final    MCHC 34.2 31.5 - 36.5 g/dL Final    RDW 14.0 10.0 - 15.0 % Final    Platelet Count 137 (L) 150 - 450 10e9/L Final    Diff Method Automated Method  Final    % Neutrophils 71.4 % Final    % Lymphocytes 19.7 % Final    % Monocytes 7.3 % Final    % Eosinophils 1.2 % Final    % Basophils 0.2 % Final    % Immature Granulocytes 0.2 % Final    Absolute Neutrophil 6.6 1.6 - 8.3 10e9/L Final    Absolute Lymphocytes 1.8 0.8 - 5.3 10e9/L Final    Absolute Monocytes 0.7 0.0 - 1.3 10e9/L Final    Absolute Eosinophils 0.1 0.0 - 0.7 10e9/L Final    Absolute Basophils 0.0 0.0 - 0.2 10e9/L Final    Abs Immature Granulocytes 0.0 0 - 0.4 10e9/L Final         2/2/2018  6:22 PM      Component Results     Component Value Ref Range & Units Status    Sodium 140 133 - 144 mmol/L Final    Potassium 3.5 3.4 - 5.3 mmol/L Final    Chloride 107 94 - 109 mmol/L Final    Carbon Dioxide 24 20 - 32 mmol/L Final    Anion Gap 9 3 - 14 mmol/L Final    Glucose 89 70 - 99 mg/dL Final    Urea Nitrogen 9 7 - 30 mg/dL  "Final    Creatinine 0.40 (L) 0.52 - 1.04 mg/dL Final    GFR Estimate >90 >60 mL/min/1.7m2 Final    Non  GFR Calc    GFR Estimate If Black >90 >60 mL/min/1.7m2 Final    African American GFR Calc    Calcium 8.3 (L) 8.5 - 10.1 mg/dL Final         2/2/2018  6:38 PM      Narrative     COMPLETE ABDOMEN ULTRASOUND  2/2/2018  6:32 PM     HISTORY: Bilateral mid abdominal pain left worse than right. Pregnant  patient, same.    COMPARISON: Renal ultrasound 11/20/2017.    FINDINGS:   Gallbladder: Normal with no cholelithiasis, wall thickening or focal  tenderness.     Bile ducts: CHD is normal diameter. No intrahepatic biliary  dilatation.    Liver: Normal.     Pancreas: Partially obscured but grossly unremarkable.     Spleen: Normal.     Right kidney: Mild to moderate hydronephrosis. This is newly  identified.    Left kidney: Mild to moderate hydronephrosis. This is not  significantly changed.    Aorta and IVC: Not specifically assessed.        Impression     IMPRESSION: Mild to moderate bilateral hydronephrosis. This is stable  on the left, but newly identified on the right. Cause is uncertain.                Thank you for choosing [facility]       Thank you for choosing [facility] for your care. Our goal is always to provide you with excellent care. Hearing back from our patients is one way we can continue to improve our services. Please take a few minutes to complete the written survey that you may receive in the mail after you visit with us. Thank you!        [employer] Information     [facility] lets you send messages to your doctor, view your test results, renew your prescriptions, schedule appointments and more. To sign up, go to [email or web address] . Click on \"Log in\" on the left side of the screen, which will take you to the Welcome page. Then click on \"Sign up Now\" on the right side of the page.     You will be asked to enter the access code listed below, as well as some personal information. Please follow " the directions to create your username and password.     Your access code is: [email or web address]  Expires: 2018  6:50 PM     Your access code will  in 90 days. If you need help or a new code, please call your [place] clinic or [phone].        Care EveryWhere ID     This is your Care EveryWhere ID. This could be used by other organizations to access your [place] medical records  [id number]        Equal Access to Services     SINDHU BROCK : Hadii roxann vega hadasho Soomaali, waaxda luqadaha, qaybta kaalmada adeegyabharathi, lane adams . So [facility] [phone].    ATENCIÓN: Si habla español, tiene a ludwig disposición servicios gratuitos de asistencia lingüística. Llame al [phone].    We comply with applicable federal civil rights laws and [place] laws. We do not discriminate on the basis of race, color, national origin, age, disability, sex, sexual orientation, or gender identity.            After Visit Summary       This is your record. Keep this with you and show to your community pharmacist(s) and doctor(s) at your next visit.

## 2018-02-02 NOTE — PROGRESS NOTES
Dr Colin notified of pt arrival, c/o LLQ abd pain and assessment of UA, FHT and uterine activity. See order to discharge now with f/u in ED for further evaluation of abdominal pain. Plan of care reviewed with pt and in agreement. To ED now via w/c with OB RN.

## 2018-02-02 NOTE — IP AVS SNAPSHOT
[facility]    [address] [address]    Phone:  [phone]    Fax:  [phone]                                       After Visit Summary   2/2/2018    Anna Hosler    MRN: [id number]           After Visit Summary Signature Page     I have received my discharge instructions, and my questions have been answered. I have discussed any challenges I see with this plan with the nurse or doctor.    ..........................................................................................................................................  Patient/Patient Representative Signature      ..........................................................................................................................................  Patient Representative Print Name and Relationship to Patient    ..................................................               ................................................  Date                                            Time    ..........................................................................................................................................  Reviewed by Signature/Title    ...................................................              ..............................................  Date                                                            Time

## 2018-02-02 NOTE — IP AVS SNAPSHOT
MRN:[id number]                      After Visit Summary   2018    Anna Hosler    MRN: [id number]           Thank you!     Thank you for choosing [facility] for your care. Our goal is always to provide you with excellent care. Hearing back from our patients is one way we can continue to improve our services. Please take a few minutes to complete the written survey that you may receive in the mail after you visit with us. Thank you!        Patient Information     Date Of Birth          1995        About your hospital stay     You were admitted on:  2018 You last received care in the:  [facility]    You were discharged on:  2018       Who to Call     For medical emergencies, please call 911.  For non-urgent questions about your medical care, please call your primary care provider or clinic, [phone]          Attending Provider     Provider Specialty    Felipa Nash MD Family Practice       Primary Care Provider Office Phone # Fax #    [place] [facility] [phone]      Further instructions from your care team       Discharge Instructions for Undelivered Patients  [facility] Phone # [phone]        Birth Prevention    Do these things to help prevent  birth:    Drink 8-10 glasses of liquid every day.    Prevent and treat constipation with high fiber foods and Metamucil if needed.    Empty your bladder frequently.    Decrease stress in your life.    Avoid strenuous activities if they produce contractions.    Stop smoking.    Do not prepare your nipples for breastfeeding by rolling or brisk touching.    Report signs of a bladder infection.    Check daily for contractions and warning signs.    Do not have sexual intercourse.    Check Twice Daily for Contractions (30 minutes each time):    Lie on your left side with a pillow behind your back for support.    Place your fingertips on your  "abdomen.    When your uterus feels tight and hard, then soft, that is a contraction.    Note the time at the start of one tightening to the start of the next tightening.    It is normal to have some contractions during pregnancy. If your feel more that five in an hour, it is too many.     Be Aware of Warning Signs:    Five or more uterine contractions in an hour.    Menstrual-like cramps for an hour.    Dull backache below the waistline for an hour.    Increased pelvic pressure for an hour that does not resolve with rest.    Abdominal cramping for an hour.    Change in vaginal discharge. If discharge is watery or bloody mucous, call your physician immediately!    \"Something just doesn't feel right\" or \"Something feels different\".    Do these things if any Warning Signs occur:    Empty your bladder.    Drink 3-4 glasses of water in half an hour.    Lie down on your left side for one hour, keeping your bladder empty.    Check for contractions. Write down the time that each one starts.    ** If warning signs do not go away in 1 hour, contact your physician.    Pending Results     No orders found from 1/31/2018 to 2/3/2018.            Admission Information     Date & Time Provider Department Dept. Phone    2/2/2018 Felipa Nash MD [facility] [phone]      Your Vitals Were     Blood Pressure Temperature Respirations Last Period          130/69 98.3  F (36.8  C) (Oral) 16 05/20/2017 (Exact Date)        MyChart Information     [employer] lets you send messages to your doctor, view your test results, renew your prescriptions, schedule appointments and more. To sign up, go to [email or web address] . Click on \"Log in\" on the left side of the screen, which will take you to the Welcome page. Then click on \"Sign up Now\" on the right side of the page.     You will be asked to enter the access code listed below, as well as some personal information. Please follow the directions to create your " username and password.     Your access code is: [email or web address]  Expires: 2018  6:50 PM     Your access code will  in 90 days. If you need help or a new code, please call your [facility] clinic or [phone].        Care EveryWhere ID     This is your Care EveryWhere ID. This could be used by other organizations to access your [facility] medical records  [id number]        Equal Access to Services     [facility] : Hadii aad ku hadasho Soomaali, waaxda luqadaha, qaybta kaalmada adeegyada, waxay idiin hayaan adejovana dillondenivanna adams . So [facility] [phone].    ATENCIÓN: Si habla español, tiene a ludwig disposición servicios gratuitos de asistencia lingüística. Llame al [phone].    We comply with applicable federal civil rights laws and [place] laws. We do not discriminate on the basis of race, color, national origin, age, disability, sex, sexual orientation, or gender identity.               Review of your medicines      UNREVIEWED medicines. Ask your doctor about these medicines        Dose / Directions    polyethylene glycol powder   Commonly known as:  MIRALAX   Used for:  Constipation, chronic        Dose:  1 capful   Take 17 g by mouth daily   Quantity:  119 g   Refills:  3                Protect others around you: Learn how to safely use, store and throw away your medicines at www.disposemymeds.org.             Medication List: This is a list of all your medications and when to take them. Check marks below indicate your daily home schedule. Keep this list as a reference.      Medications           Morning Afternoon Evening Bedtime As Needed    polyethylene glycol powder   Commonly known as:  MIRALAX   Take 17 g by mouth daily                                          More Information        Kick Counts  It s normal to worry about your baby s health. One way you can know your baby s doing well is to record the baby s movements once a day. This is called a kick count. You will usually feel your baby move by  the 20th week of pregnancy. Remember to take your kick count records to all your appointments with your healthcare provider.       How to Count Kicks    Choose a time when the baby is active, such as after a meal.     Sit comfortably or lie on your side.     The first time the baby moves, write down the time.     Count each movement until the baby has moved 10 times. This can take from 20 minutes to 2 hours.     If the baby hasn t moved 4 times in 1 hour, pat your stomach to wake the baby up.    Write down the time you feel the baby s 10th movement.    Try to do it at the same time each day.  When to Call Your Healthcare Provider  Call your healthcare provider right away if you notice any of the following:    Your baby moves fewer than 10 times in 2 hours while you re doing kick counts.    Your baby moves much less often than on the days before.    You have not felt your baby move all day.    [id number] [employer]. [address] [place] [address], [place] [address]. All rights reserved. This information is not intended as a substitute for professional medical care. Always follow your healthcare professional's instructions.  This information has been modified by your health care provider with permission from the publisher.

## 2018-02-02 NOTE — ED NOTES
Bed: ED24  Expected date: 2/2/18  Expected time: 4:34 PM  Means of arrival:   Comments:  Pt from OB,r/o enzo

## 2018-02-02 NOTE — PROGRESS NOTES
S:Patient presents due to  LLQ abdominal pain at 29 weeks.  B:Unknown   Allergies: Amoxicillin and Neomycin-polymyxin-hc  A:Vital Signs  Temp: 98.3  F (36.8  C)  Temp src: Oral  Resp: 16  Heart Rate: 71  BP: 130/69     FHR  Monitor: External US  Variability: Moderate  Baseline Rate (Fetus A): 145 bpm  Baseline Classification: Normal  Accelerations: Present  Decelerations: None  FHTs are category 1.  Uterine Activity  Monitor: [place]  Contraction Frequency (minutes): none  Contraction Duration (seconds): none  Resting Tone Palpated: Soft     No visits with results within 1 Day(s) from this visit.  Latest known visit with results is:    Admission on 2017, Discharged on 2017   Component Date Value     Color Urine 2017 Straw      Appearance Urine 2017 Clear      Glucose Urine 2017 Negative      Bilirubin Urine 2017 Negative      Ketones Urine 2017 Negative      Specific Gravity Urine 2017 1.003      Blood Urine 2017 Negative      pH Urine 2017 6.5      Protein Albumin Urine 2017 Negative      Urobilinogen mg/dL 2017 Normal      Nitrite Urine 2017 Negative      Leukocyte Esterase Urine 2017 Negative      Source 2017 Midstream Urine      WBC Urine 2017 1      RBC Urine 2017 1      Bacteria Urine 2017 Few*     Mucous Urine 2017 Present*     WBC 2017 8.7      RBC Count 2017 3.82      Hemoglobin 2017 12.2      Hematocrit 2017 35.9      MCV 2017 94      MCH 2017 31.9      MCHC 2017 34.0      RDW 2017 13.7      Platelet Count 2017 151      Diff Method 2017 Automated Method      % Neutrophils 2017 73.8      % Lymphocytes 2017 18.5      % Monocytes 2017 6.4      % Eosinophils 2017 1.0      % Basophils 2017 0.1      % Immature Granulocytes 2017 0.2      Absolute Neutrophil 2017 6.4      Absolute Lymphocytes  11/20/2017 1.6      Absolute Monocytes 11/20/2017 0.6      Absolute Eosinophils 11/20/2017 0.1      Absolute Basophils 11/20/2017 0.0      Abs Immature Granulocytes 11/20/2017 0.0      Sodium 11/20/2017 140      Potassium 11/20/2017 3.4      Chloride 11/20/2017 108      Carbon Dioxide 11/20/2017 22      Anion Gap 11/20/2017 10      Glucose 11/20/2017 95      Urea Nitrogen 11/20/2017 7      Creatinine 11/20/2017 0.50*     GFR Estimate 11/20/2017 >90      GFR Estimate If Black 11/20/2017 >90      Calcium 11/20/2017 8.9       O/C Nomiina to be notified.  Plan includes; Monitor, push po fluids, observe and reevaluate and Labs . Reviewed with patient and she agrees with plan.        Prenatal Breastfeeding Education Toolkit provided for patient to review,helping her to make an informed decision on a feeding choice for her baby. Patient accepted. Questions answered.    Oriented to room and call light.

## 2018-02-02 NOTE — ED NOTES
Sent from OB clinic for occasional sharp pain to back (kidney) area. The patient was cleared by OB, and sent here for a workup for pyelonephritis.

## 2018-02-02 NOTE — ED AVS SNAPSHOT
[facility] Emergency Department    [address] [address]    Phone:  [phone]    Fax:  [phone]                                       Anna Hosler   MRN: [id number]    Department:  [facility] Emergency Department   Date of Visit:  2/2/2018           After Visit Summary Signature Page     I have received my discharge instructions, and my questions have been answered. I have discussed any challenges I see with this plan with the nurse or doctor.    ..........................................................................................................................................  Patient/Patient Representative Signature      ..........................................................................................................................................  Patient Representative Print Name and Relationship to Patient    ..................................................               ................................................  Date                                            Time    ..........................................................................................................................................  Reviewed by Signature/Title    ...................................................              ..............................................  Date                                                            Time

## 2018-02-02 NOTE — DISCHARGE INSTRUCTIONS
"Discharge Instructions for Undelivered Patients  [facility] Phone # [phone]        Birth Prevention    Do these things to help prevent  birth:    Drink 8-10 glasses of liquid every day.    Prevent and treat constipation with high fiber foods and Metamucil if needed.    Empty your bladder frequently.    Decrease stress in your life.    Avoid strenuous activities if they produce contractions.    Stop smoking.    Do not prepare your nipples for breastfeeding by rolling or brisk touching.    Report signs of a bladder infection.    Check daily for contractions and warning signs.    Do not have sexual intercourse.    Check Twice Daily for Contractions (30 minutes each time):    Lie on your left side with a pillow behind your back for support.    Place your fingertips on your abdomen.    When your uterus feels tight and hard, then soft, that is a contraction.    Note the time at the start of one tightening to the start of the next tightening.    It is normal to have some contractions during pregnancy. If your feel more that five in an hour, it is too many.     Be Aware of Warning Signs:    Five or more uterine contractions in an hour.    Menstrual-like cramps for an hour.    Dull backache below the waistline for an hour.    Increased pelvic pressure for an hour that does not resolve with rest.    Abdominal cramping for an hour.    Change in vaginal discharge. If discharge is watery or bloody mucous, call your physician immediately!    \"Something just doesn't feel right\" or \"Something feels different\".    Do these things if any Warning Signs occur:    Empty your bladder.    Drink 3-4 glasses of water in half an hour.    Lie down on your left side for one hour, keeping your bladder empty.    Check for contractions. Write down the time that each one starts.    ** If warning signs do not go away in 1 hour, contact your physician.  "

## 2018-02-03 NOTE — ED NOTES
Updated on the ultrasound being resulted, and the MD will come and explain the ultrasound results.

## 2018-02-03 NOTE — DISCHARGE INSTRUCTIONS
Push fluids, rest.  Follow-up in OB/GYN clinic as planned for prenatal care.  Return to the emergency department if worse or changes.

## 2018-02-26 ENCOUNTER — APPOINTMENT (OUTPATIENT)
Dept: ULTRASOUND IMAGING | Facility: CLINIC | Age: 23
End: 2018-02-26
Attending: FAMILY MEDICINE
Payer: COMMERCIAL

## 2018-02-26 ENCOUNTER — HOSPITAL ENCOUNTER (OUTPATIENT)
Facility: CLINIC | Age: 23
Discharge: HOME | End: 2018-02-26
Attending: FAMILY MEDICINE | Admitting: FAMILY MEDICINE
Payer: COMMERCIAL

## 2018-02-26 VITALS — HEART RATE: 95 BPM | SYSTOLIC BLOOD PRESSURE: 134 MMHG | TEMPERATURE: 98 F | DIASTOLIC BLOOD PRESSURE: 63 MMHG

## 2018-02-26 LAB
ALBUMIN UR-MCNC: NEGATIVE MG/DL
APPEARANCE UR: CLEAR
BACTERIA #/AREA URNS HPF: ABNORMAL /HPF
BILIRUB UR QL STRIP: NEGATIVE
COLOR UR AUTO: YELLOW
GLUCOSE UR STRIP-MCNC: NEGATIVE MG/DL
HGB UR QL STRIP: NEGATIVE
KETONES UR STRIP-MCNC: NEGATIVE MG/DL
LEUKOCYTE ESTERASE UR QL STRIP: NEGATIVE
MUCOUS THREADS #/AREA URNS LPF: PRESENT /LPF
NITRATE UR QL: NEGATIVE
PH UR STRIP: 7 PH (ref 5–7)
RBC #/AREA URNS AUTO: 1 /HPF (ref 0–2)
SOURCE: ABNORMAL
SP GR UR STRIP: 1.01 (ref 1–1.03)
SQUAMOUS #/AREA URNS AUTO: <1 /HPF (ref 0–1)
UROBILINOGEN UR STRIP-MCNC: 0 MG/DL (ref 0–2)
WBC #/AREA URNS AUTO: 3 /HPF (ref 0–2)

## 2018-02-26 PROCEDURE — 81001 URINALYSIS AUTO W/SCOPE: CPT | Performed by: FAMILY MEDICINE

## 2018-02-26 PROCEDURE — 76816 OB US FOLLOW-UP PER FETUS: CPT

## 2018-02-26 NOTE — IP AVS SNAPSHOT
[facility]    [address] [address]    Phone:  [phone]    Fax:  [phone]                                       After Visit Summary   2/26/2018    Anna Hosler    MRN: [id number]           After Visit Summary Signature Page     I have received my discharge instructions, and my questions have been answered. I have discussed any challenges I see with this plan with the nurse or doctor.    ..........................................................................................................................................  Patient/Patient Representative Signature      ..........................................................................................................................................  Patient Representative Print Name and Relationship to Patient    ..................................................               ................................................  Date                                            Time    ..........................................................................................................................................  Reviewed by Signature/Title    ...................................................              ..............................................  Date                                                            Time

## 2018-02-26 NOTE — DISCHARGE INSTRUCTIONS
Keep your scheduled appointment on Thursday with Dr. Trent.  Please call your provider clinic or [facility] if you experience a recurrence of  Vaginal bleeding or have any other questions/concerns.

## 2018-02-26 NOTE — PROGRESS NOTES
No evidence of blood on glove following vaginal exam per RN.  Dr. Thomas notified that US is complete.  She states patient may discharge and see Dr. Trent on Thursday as previously scheduled.

## 2018-02-26 NOTE — PROGRESS NOTES
"Patient was at work and used bathroom.  Noticed bright red spotting on tissue.  No blood in toilet bowl and no blood stains noted on underwear.  She felt \"a few menstrual type cramps\" on the way in but not any longer.  Patient was placed on fetal monitor and urine was collected and sent to lab.  Denies other concerns.  Fetus active.  Next clinic appointment is in 3 days with Dr. Trent.  "

## 2018-02-26 NOTE — PROGRESS NOTES
Spoke with Dr. Thomas regarding this patient and no signs of further bleeding.  Orders received for bedside ultrasound and to culture urine.  Requests cervix exam after ultrasound.  Patient not feeling tightenings or abdominal pain.  Patient aware of plan of care and in agreement.

## 2018-02-26 NOTE — IP AVS SNAPSHOT
MRN:[id number]                      After Visit Summary   2/26/2018    Anna Hosler    MRN: [id number]           Thank you!     Thank you for choosing [facility] for your care. Our goal is always to provide you with excellent care. Hearing back from our patients is one way we can continue to improve our services. Please take a few minutes to complete the written survey that you may receive in the mail after you visit with us. Thank you!        Patient Information     Date Of Birth          1995        Designated Caregiver       Most Recent Value    Caregiver    Will someone help with your care after discharge? yes    Name of designated caregiver mother      About your hospital stay     You were admitted on:  February 26, 2018 You last received care in the:  [facility]    You were discharged on:  February 26, 2018       Who to Call     For medical emergencies, please call 911.  For non-urgent questions about your medical care, please call your primary care provider or clinic, [phone]          Attending Provider     Provider Specialty    Deb Trent MD Family Practice       Primary Care Provider Office Phone # Fax #    [place] [facility] [phone]      Further instructions from your care team       Keep your scheduled appointment on Thursday with Dr. Trent.  Please call your provider clinic or [facility] if you experience a recurrence of  Vaginal bleeding or have any other questions/concerns.    Pending Results     Date and Time Order Name Status Description    2/26/2018 1525 US OB Ltd One Or More Fetus FU/Repeat In process             Admission Information     Date & Time Provider Department Dept. Phone    2/26/2018 Deb Trent MD [facility] [phone]      Your Vitals Were     Blood Pressure Pulse Temperature Last Period          134/63 95 98  F (36.7  C) 05/20/2017 (Exact Date)        MyChart Information     MyChart lets you  "send messages to your doctor, view your test results, renew your prescriptions, schedule appointments and more. To sign up, go to [email or web address] . Click on \"Log in\" on the left side of the screen, which will take you to the Welcome page. Then click on \"Sign up Now\" on the right side of the page.     You will be asked to enter the access code listed below, as well as some personal information. Please follow the directions to create your username and password.     Your access code is: [email or web address]  Expires: 2018  5:17 PM     Your access code will  in 90 days. If you need help or a new code, please call your [facility] clinic or [phone].        Care EveryWhere ID     This is your Care EveryWhere ID. This could be used by other organizations to access your [facility] medical records  [id number]        Equal Access to Services     [facility]IE : Brenda Will, waaxbharathi dugan, qaybta kaalmada kan, lane bui. So [facility] [phone].    ATENCIÓN: Si habla español, tiene a ludwig disposición servicios gratuitos de asistencia lingüística. Tai al [phone].    We comply with applicable federal civil rights laws and [place] laws. We do not discriminate on the basis of race, color, national origin, age, disability, sex, sexual orientation, or gender identity.               Review of your medicines      UNREVIEWED medicines. Ask your doctor about these medicines        Dose / Directions    budesonide 32 MCG/ACT spray   Commonly known as:  RINOCORT AQUA        Dose:  1 spray   Spray 1 spray into both nostrils daily   Refills:  0       prenatal multivitamin plus iron 27-0.8 MG Tabs per tablet        Dose:  1 tablet   Take 1 tablet by mouth daily   Refills:  0       TYLENOL PO        Dose:  500 mg   Take 500 mg by mouth every 6 hours as needed for mild pain or fever   Refills:  0                Protect others around you: Learn how to safely use, store and " throw away your medicines at www.disposemymeds.org.             Medication List: This is a list of all your medications and when to take them. Check marks below indicate your daily home schedule. Keep this list as a reference.      Medications           Morning Afternoon Evening Bedtime As Needed    budesonide 32 MCG/ACT spray   Commonly known as:  RINOCORT AQUA   Spray 1 spray into both nostrils daily                                prenatal multivitamin plus iron 27-0.8 MG Tabs per tablet   Take 1 tablet by mouth daily                                TYLENOL PO   Take 500 mg by mouth every 6 hours as needed for mild pain or fever

## 2018-04-06 ENCOUNTER — HOSPITAL ENCOUNTER (OUTPATIENT)
Facility: CLINIC | Age: 23
Discharge: HOME | End: 2018-04-06
Attending: FAMILY MEDICINE | Admitting: FAMILY MEDICINE
Payer: COMMERCIAL

## 2018-04-06 VITALS
SYSTOLIC BLOOD PRESSURE: 113 MMHG | DIASTOLIC BLOOD PRESSURE: 69 MMHG | HEART RATE: 87 BPM | TEMPERATURE: 97.9 F | RESPIRATION RATE: 18 BRPM

## 2018-04-06 PROBLEM — Z34.90 PREGNANCY: Status: ACTIVE | Noted: 2018-04-06

## 2018-04-06 LAB — RUPTURE OF FETAL MEMBRANES BY ROM PLUS: NEGATIVE

## 2018-04-06 PROCEDURE — G0463 HOSPITAL OUTPT CLINIC VISIT: HCPCS

## 2018-04-06 PROCEDURE — 59025 FETAL NON-STRESS TEST: CPT

## 2018-04-06 PROCEDURE — 59025 FETAL NON-STRESS TEST: CPT | Mod: 26 | Performed by: OBSTETRICS & GYNECOLOGY

## 2018-04-06 PROCEDURE — 84112 EVAL AMNIOTIC FLUID PROTEIN: CPT | Performed by: FAMILY MEDICINE

## 2018-04-06 RX ORDER — SODIUM CHLORIDE, SODIUM LACTATE, POTASSIUM CHLORIDE, CALCIUM CHLORIDE 600; 310; 30; 20 MG/100ML; MG/100ML; MG/100ML; MG/100ML
INJECTION, SOLUTION INTRAVENOUS CONTINUOUS
Status: DISCONTINUED | OUTPATIENT
Start: 2018-04-06 | End: 2018-04-07 | Stop reason: HOSPADM

## 2018-04-06 RX ORDER — ONDANSETRON 2 MG/ML
4 INJECTION INTRAMUSCULAR; INTRAVENOUS EVERY 6 HOURS PRN
Status: DISCONTINUED | OUTPATIENT
Start: 2018-04-06 | End: 2018-04-07 | Stop reason: HOSPADM

## 2018-04-06 NOTE — IP AVS SNAPSHOT
MRN:[id number]                      After Visit Summary   4/6/2018    Anna Hosler    MRN: [id number]           Thank you!     Thank you for choosing [facility] for your care. Our goal is always to provide you with excellent care. Hearing back from our patients is one way we can continue to improve our services. Please take a few minutes to complete the written survey that you may receive in the mail after you visit with us. Thank you!        Patient Information     Date Of Birth          1995        Designated Caregiver       Most Recent Value    Caregiver    Will someone help with your care after discharge? yes    Name of designated caregiver mother      About your hospital stay     You were admitted on:  April 6, 2018 You last received care in the:  [facility]    You were discharged on:  April 6, 2018       Who to Call     For medical emergencies, please call 911.  For non-urgent questions about your medical care, please call your primary care provider or clinic, [phone]          Attending Provider     Provider Deb Guajardo MD Family Practice       Primary Care Provider Office Phone # Fax #    [facility] [phone]      Further instructions from your care team       Discharge Instructions for Undelivered Patients    Diet:    Drink 8 to 12 glasses of liquids (milk, juice, water) every day.    You may eat meals and snacks..    Activity:      Call your doctor if your baby is moving less than usual.    Medicines:    My care team has reviewed my medicines with me.    My care team has given me a list of my medicines.    My care team has prescribed a new medicine. They have either sent it home with me or ordered it from the pharmacy.    Call your provider if you notice:    Swelling in your face or increased swelling in your hands or legs.    Headaches that are not relieved by Tylenol (acetaminophen).    Changes in your vision  "(blurring; seeing spots or stars).    Nausea (sick to your stomach) and vomiting (throwing up).    Weight gain of 5 pounds per week.    Heartburn that doesn't go away.    Signs of bladder infection: pain when you urinate (use the toilet), needing to go more often or more urgently.    The bag of cabello (membranes) breaks, or you notice leaking in your underwear.    Bright red blood in your underwear.    Abdominal (lower belly) or stomach pain.    For first baby: Contractions (tightenings) less than 5 minutes apart for one hour or more.    Increase or change in vaginal discharge (note the color and amount).        Pending Results     No orders found from 2018 to 2018.            Admission Information     Date & Time Provider Department Dept. Phone    2018 Deb Trent MD [facility] [phone]      Your Vitals Were     Blood Pressure Pulse Temperature Respirations Last Period       113/69 87 97.9  F (36.6  C) (Oral) 18 2017 (Exact Date)       MyChart Information     [employer] lets you send messages to your doctor, view your test results, renew your prescriptions, schedule appointments and more. To sign up, go to [email or web address] . Click on \"Log in\" on the left side of the screen, which will take you to the Welcome page. Then click on \"Sign up Now\" on the right side of the page.     You will be asked to enter the access code listed below, as well as some personal information. Please follow the directions to create your username and password.     Your access code is: [email or web address]  Expires: 2018  6:17 PM     Your access code will  in 90 days. If you need help or a new code, please call your [facility] clinic or [phone].        Care EveryWhere ID     This is your Care EveryWhere ID. This could be used by other organizations to access your [facility] medical records  [id number]        Equal Access to Services     SINDHU BROCK AH: yvonne Lala " fili duganjonathon andrewlane arias eleonoravilma tobaraan ah. So [facility] [place] [phone].    ATENCIÓN: Si deni todd, tiene a ludwig disposición servicios gratuitos de asistencia lingüística. Llame al [phone].    We comply with applicable federal civil rights laws and [place] laws. We do not discriminate on the basis of race, color, national origin, age, disability, sex, sexual orientation, or gender identity.               Review of your medicines      UNREVIEWED medicines. Ask your doctor about these medicines        Dose / Directions    budesonide 32 MCG/ACT spray   Commonly known as:  RINOCORT AQUA        Dose:  1 spray   Spray 1 spray into both nostrils daily   Refills:  0       prenatal multivitamin plus iron 27-0.8 MG Tabs per tablet        Dose:  1 tablet   Take 1 tablet by mouth daily   Refills:  0       TYLENOL PO        Dose:  500 mg   Take 500 mg by mouth every 6 hours as needed for mild pain or fever   Refills:  0                Protect others around you: Learn how to safely use, store and throw away your medicines at www.disposemymeds.org.             Medication List: This is a list of all your medications and when to take them. Check marks below indicate your daily home schedule. Keep this list as a reference.      Medications           Morning Afternoon Evening Bedtime As Needed    budesonide 32 MCG/ACT spray   Commonly known as:  RINOCORT AQUA   Spray 1 spray into both nostrils daily                                prenatal multivitamin plus iron 27-0.8 MG Tabs per tablet   Take 1 tablet by mouth daily                                TYLENOL PO   Take 500 mg by mouth every 6 hours as needed for mild pain or fever

## 2018-04-06 NOTE — IP AVS SNAPSHOT
[facility]    [address] [address]    Phone:  [phone]    Fax:  [phone]                                       After Visit Summary   4/6/2018    Anna Hosler    MRN: [id number]           After Visit Summary Signature Page     I have received my discharge instructions, and my questions have been answered. I have discussed any challenges I see with this plan with the nurse or doctor.    ..........................................................................................................................................  Patient/Patient Representative Signature      ..........................................................................................................................................  Patient Representative Print Name and Relationship to Patient    ..................................................               ................................................  Date                                            Time    ..........................................................................................................................................  Reviewed by Signature/Title    ...................................................              ..............................................  Date                                                            Time

## 2018-04-07 NOTE — PROGRESS NOTES
Prime 38+2 [facility] the birth[facility] at 2240 stating she thinks her water broke 2130.  amnisure done and sent. Cat 1 tracing and no contractions. Orientated to room and plan. Will cont to monitor.

## 2018-04-07 NOTE — DISCHARGE INSTRUCTIONS
Discharge Instructions for Undelivered Patients    Diet:    Drink 8 to 12 glasses of liquids (milk, juice, water) every day.    You may eat meals and snacks..    Activity:      Call your doctor if your baby is moving less than usual.    Medicines:    My care team has reviewed my medicines with me.    My care team has given me a list of my medicines.    My care team has prescribed a new medicine. They have either sent it home with me or ordered it from the pharmacy.    Call your provider if you notice:    Swelling in your face or increased swelling in your hands or legs.    Headaches that are not relieved by Tylenol (acetaminophen).    Changes in your vision (blurring; seeing spots or stars).    Nausea (sick to your stomach) and vomiting (throwing up).    Weight gain of 5 pounds per week.    Heartburn that doesn't go away.    Signs of bladder infection: pain when you urinate (use the toilet), needing to go more often or more urgently.    The bag of cabello (membranes) breaks, or you notice leaking in your underwear.    Bright red blood in your underwear.    Abdominal (lower belly) or stomach pain.    For first baby: Contractions (tightenings) less than 5 minutes apart for one hour or more.    Increase or change in vaginal discharge (note the color and amount).

## 2018-04-07 NOTE — PROGRESS NOTES
Amnisure negative. Orders received from [facility] to [facility]chrage. NST verified by Valerie ROACH. Pt has appt with Dr. Trent 4/12.  Discharge instructions given to pt and mother. Verbal understanding from both. Pt amb to car with mother.

## 2018-04-17 ENCOUNTER — HOSPITAL ENCOUNTER (INPATIENT)
Facility: CLINIC | Age: 23
LOS: 3 days | Discharge: HOME | End: 2018-04-20
Attending: FAMILY MEDICINE | Admitting: FAMILY MEDICINE
Payer: COMMERCIAL

## 2018-04-17 LAB
ABO + RH BLD: NORMAL
ABO + RH BLD: NORMAL
BLD GP AB SCN SERPL QL: NORMAL
BLOOD BANK CMNT PATIENT-IMP: NORMAL
SPECIMEN EXP DATE BLD: NORMAL

## 2018-04-17 PROCEDURE — 86900 BLOOD TYPING SEROLOGIC ABO: CPT | Performed by: FAMILY MEDICINE

## 2018-04-17 PROCEDURE — 86850 RBC ANTIBODY SCREEN: CPT | Performed by: FAMILY MEDICINE

## 2018-04-17 PROCEDURE — 12000031 ZZH R&B OB CRITICAL

## 2018-04-17 PROCEDURE — 36415 COLL VENOUS BLD VENIPUNCTURE: CPT | Performed by: FAMILY MEDICINE

## 2018-04-17 PROCEDURE — 86780 TREPONEMA PALLIDUM: CPT | Performed by: FAMILY MEDICINE

## 2018-04-17 PROCEDURE — 86901 BLOOD TYPING SEROLOGIC RH(D): CPT | Performed by: FAMILY MEDICINE

## 2018-04-17 RX ORDER — NALOXONE HYDROCHLORIDE 0.4 MG/ML
.1-.4 INJECTION, SOLUTION INTRAMUSCULAR; INTRAVENOUS; SUBCUTANEOUS
Status: DISCONTINUED | OUTPATIENT
Start: 2018-04-17 | End: 2018-04-20 | Stop reason: HOSPADM

## 2018-04-17 RX ORDER — OXYTOCIN 10 [USP'U]/ML
10 INJECTION, SOLUTION INTRAMUSCULAR; INTRAVENOUS
Status: DISCONTINUED | OUTPATIENT
Start: 2018-04-17 | End: 2018-04-20 | Stop reason: HOSPADM

## 2018-04-17 RX ORDER — OXYTOCIN/0.9 % SODIUM CHLORIDE 30/500 ML
100-340 PLASTIC BAG, INJECTION (ML) INTRAVENOUS CONTINUOUS PRN
Status: COMPLETED | OUTPATIENT
Start: 2018-04-17 | End: 2018-04-18

## 2018-04-17 RX ORDER — ACETAMINOPHEN 325 MG/1
650 TABLET ORAL EVERY 4 HOURS PRN
Status: DISCONTINUED | OUTPATIENT
Start: 2018-04-17 | End: 2018-04-20 | Stop reason: HOSPADM

## 2018-04-17 RX ORDER — OXYCODONE AND ACETAMINOPHEN 5; 325 MG/1; MG/1
1 TABLET ORAL
Status: DISCONTINUED | OUTPATIENT
Start: 2018-04-17 | End: 2018-04-20 | Stop reason: HOSPADM

## 2018-04-17 RX ORDER — ONDANSETRON 2 MG/ML
4 INJECTION INTRAMUSCULAR; INTRAVENOUS EVERY 6 HOURS PRN
Status: DISCONTINUED | OUTPATIENT
Start: 2018-04-17 | End: 2018-04-20 | Stop reason: HOSPADM

## 2018-04-17 RX ORDER — METHYLERGONOVINE MALEATE 0.2 MG/ML
200 INJECTION INTRAVENOUS
Status: DISCONTINUED | OUTPATIENT
Start: 2018-04-17 | End: 2018-04-20 | Stop reason: HOSPADM

## 2018-04-17 RX ORDER — IBUPROFEN 800 MG/1
800 TABLET, FILM COATED ORAL
Status: COMPLETED | OUTPATIENT
Start: 2018-04-17 | End: 2018-04-18

## 2018-04-17 RX ORDER — CARBOPROST TROMETHAMINE 250 UG/ML
250 INJECTION, SOLUTION INTRAMUSCULAR
Status: DISCONTINUED | OUTPATIENT
Start: 2018-04-17 | End: 2018-04-20 | Stop reason: HOSPADM

## 2018-04-17 RX ORDER — SODIUM CHLORIDE, SODIUM LACTATE, POTASSIUM CHLORIDE, CALCIUM CHLORIDE 600; 310; 30; 20 MG/100ML; MG/100ML; MG/100ML; MG/100ML
INJECTION, SOLUTION INTRAVENOUS CONTINUOUS
Status: DISCONTINUED | OUTPATIENT
Start: 2018-04-17 | End: 2018-04-20 | Stop reason: HOSPADM

## 2018-04-17 NOTE — IP AVS SNAPSHOT
MRN:[id number]                      After Visit Summary   4/17/2018    Anna Hosler    MRN: [id number]           Thank you!     Thank you for choosing [facility] for your care. Our goal is always to provide you with excellent care. Hearing back from our patients is one way we can continue to improve our services. Please take a few minutes to complete the written survey that you may receive in the mail after you visit with us. Thank you!        Patient Information     Date Of Birth          1995        Designated Caregiver       Most Recent Value    Caregiver    Will someone help with your care after discharge? yes    Name of designated caregiver mother      About your hospital stay     You were admitted on:  April 17, 2018 You last received care in the:  [facility]    You were discharged on:  April 20, 2018       Who to Call     For medical emergencies, please call 911.  For non-urgent questions about your medical care, please call your primary care provider or clinic, [phone]          Attending Provider     Provider Deb Guajardo MD Family Practice       Primary Care Provider Office Phone # Fax #    [facility] [phone]      Further instructions from your care team       Postpartum Vaginal Delivery Instructions    Activity       Ask family and friends for help when you need it.    Do not place anything in your vagina for 6 weeks.    You are not restricted on other activities, but take it easy for a few weeks to allow your body to recover from delivery.  You are able to do any activities you feel up to that point.    No driving until you have stopped taking your pain medications (usually two weeks after delivery).     Call your health care provider if you have any of these symptoms:       Increased pain, swelling, redness, or fluid around your stiches from an episiotomy or perineal tear.    A fever above 100.4 F (38 C) with or  without chills when placing a thermometer under your tongue.    You soak a sanitary pad with blood within 1 hour, or you see blood clots larger than a golf ball.    Bleeding that lasts more than 6 weeks.    Vaginal discharge that smells bad.    Severe pain, cramping or tenderness in your lower belly area.    A need to urinate more frequently (use the toilet more often), more urgently (use the toilet very quickly), or it burns when you urinate.    Nausea and vomiting.    Redness, swelling or pain around a vein in your leg.    Problems breastfeeding or a red or painful area on your breast.    Chest pain and cough or are gasping for air.    Problems coping with sadness, anxiety, or depression.  If you have any concerns about hurting yourself or the baby, call your provider immediately.     You have questions or concerns after you return home.     Keep your hands clean:  Always wash your hands before touching your perineal area and stitches.  This helps reduce your risk of infection.  If your hands aren't dirty, you may use an alcohol hand-rub to clean your hands. Keep your nails clean and short.        Pending Results     No orders found from 4/15/2018 to 4/18/2018.            Statement of Approval     Ordered          04/20/18 0748  I have reviewed and agree with all the recommendations and orders detailed in this document.  EFFECTIVE NOW     Approved and electronically signed by:  Deb Trent MD           04/19/18 0743  I have reviewed and agree with all the recommendations and orders detailed in this document.  EFFECTIVE NOW     Approved and electronically signed by:  Deb Trent MD             Admission Information     Date & Time Provider Department Dept. Phone    4/17/2018 Deb Trent MD [facility] [phone]      Your Vitals Were     Blood Pressure Pulse Temperature Respirations Last Period Pulse Oximetry    112/62 84 98.9  F (37.2  C) (Oral) 16 05/20/2017 (Exact Date) 99%      MyChart  "Information     [employer] lets you send messages to your doctor, view your test results, renew your prescriptions, schedule appointments and more. To sign up, go to [email or web address] . Click on \"Log in\" on the left side of the screen, which will take you to the Welcome page. Then click on \"Sign up Now\" on the right side of the page.     You will be asked to enter the access code listed below, as well as some personal information. Please follow the directions to create your username and password.     Your access code is: [email or web address]  Expires: 2018  6:17 PM     Your access code will  in 90 days. If you need help or a new code, please call your [facility] clinic or [phone].        Care EveryWhere ID     This is your Care EveryWhere ID. This could be used by other organizations to access your [facility] medical records  [id number]        Equal Access to Services     SINDHU BROCK AH: Brenda campoo Soluzma, waaxda lumichelle, qaybta kaalmada adehugo, lane adams . So [facility] [phone].    ATENCIÓN: Si habla español, tiene a ludwig disposición servicios gratuitos de asistencia lingüística. Llame al [phone].    We comply with applicable federal civil rights laws and [place] laws. We do not discriminate on the basis of race, color, national origin, age, disability, sex, sexual orientation, or gender identity.               Review of your medicines      CONTINUE these medicines which have NOT CHANGED        Dose / Directions    budesonide 32 MCG/ACT spray   Commonly known as:  RINOCORT AQUA        Dose:  1 spray   Spray 1 spray into both nostrils daily   Refills:  0       prenatal multivitamin plus iron 27-0.8 MG Tabs per tablet        Dose:  1 tablet   Take 1 tablet by mouth daily   Refills:  0       TYLENOL PO        Dose:  500 mg   Take 500 mg by mouth every 6 hours as needed for mild pain or fever   Refills:  0                Protect others around you: Learn how to " safely use, store and throw away your medicines at www.disposemymeds.org.             Medication List: This is a list of all your medications and when to take them. Check marks below indicate your daily home schedule. Keep this list as a reference.      Medications           Morning Afternoon Evening Bedtime As Needed    budesonide 32 MCG/ACT spray   Commonly known as:  RINOCORT AQUA   Spray 1 spray into both nostrils daily                                prenatal multivitamin plus iron 27-0.8 MG Tabs per tablet   Take 1 tablet by mouth daily                                TYLENOL PO   Take 500 mg by mouth every 6 hours as needed for mild pain or fever

## 2018-04-17 NOTE — IP AVS SNAPSHOT
[facility]    [address] [address]    Phone:  [phone]    Fax:  [phone]                                       After Visit Summary   4/17/2018    Anna Hosler    MRN: [id number]           After Visit Summary Signature Page     I have received my discharge instructions, and my questions have been answered. I have discussed any challenges I see with this plan with the nurse or doctor.    ..........................................................................................................................................  Patient/Patient Representative Signature      ..........................................................................................................................................  Patient Representative Print Name and Relationship to Patient    ..................................................               ................................................  Date                                            Time    ..........................................................................................................................................  Reviewed by Signature/Title    ...................................................              ..............................................  Date                                                            Time

## 2018-04-18 ENCOUNTER — ANESTHESIA (OUTPATIENT)
Dept: OBGYN | Facility: CLINIC | Age: 23
End: 2018-04-18
Payer: COMMERCIAL

## 2018-04-18 ENCOUNTER — ANESTHESIA EVENT (OUTPATIENT)
Dept: OBGYN | Facility: CLINIC | Age: 23
End: 2018-04-18
Payer: COMMERCIAL

## 2018-04-18 LAB — T PALLIDUM IGG+IGM SER QL: NEGATIVE

## 2018-04-18 PROCEDURE — 00HU33Z INSERTION OF INFUSION DEVICE INTO SPINAL CANAL, PERCUTANEOUS APPROACH: ICD-10-PCS | Performed by: NURSE ANESTHETIST, CERTIFIED REGISTERED

## 2018-04-18 PROCEDURE — 0UQMXZZ REPAIR VULVA, EXTERNAL APPROACH: ICD-10-PCS | Performed by: FAMILY MEDICINE

## 2018-04-18 PROCEDURE — 25000125 ZZHC RX 250: Performed by: NURSE ANESTHETIST, CERTIFIED REGISTERED

## 2018-04-18 PROCEDURE — 25000128 H RX IP 250 OP 636

## 2018-04-18 PROCEDURE — 72200001 ZZH LABOR CARE VAGINAL DELIVERY SINGLE

## 2018-04-18 PROCEDURE — 25000125 ZZHC RX 250: Performed by: FAMILY MEDICINE

## 2018-04-18 PROCEDURE — 25000128 H RX IP 250 OP 636: Performed by: FAMILY MEDICINE

## 2018-04-18 PROCEDURE — 25000128 H RX IP 250 OP 636: Performed by: NURSE ANESTHETIST, CERTIFIED REGISTERED

## 2018-04-18 PROCEDURE — 40000671 ZZH STATISTIC ANESTHESIA CASE

## 2018-04-18 PROCEDURE — 37000011 ZZH ANESTHESIA WARD SERVICE: Performed by: NURSE ANESTHETIST, CERTIFIED REGISTERED

## 2018-04-18 PROCEDURE — 12000027 ZZH R&B OB

## 2018-04-18 PROCEDURE — 25000132 ZZH RX MED GY IP 250 OP 250 PS 637: Performed by: FAMILY MEDICINE

## 2018-04-18 PROCEDURE — 0KQM0ZZ REPAIR PERINEUM MUSCLE, OPEN APPROACH: ICD-10-PCS | Performed by: FAMILY MEDICINE

## 2018-04-18 PROCEDURE — 3E0S3BZ INTRODUCTION OF ANESTHETIC AGENT INTO EPIDURAL SPACE, PERCUTANEOUS APPROACH: ICD-10-PCS | Performed by: NURSE ANESTHETIST, CERTIFIED REGISTERED

## 2018-04-18 RX ORDER — LIDOCAINE HYDROCHLORIDE AND EPINEPHRINE 15; 5 MG/ML; UG/ML
INJECTION, SOLUTION EPIDURAL PRN
Status: DISCONTINUED | OUTPATIENT
Start: 2018-04-18 | End: 2018-04-18

## 2018-04-18 RX ORDER — AMOXICILLIN 250 MG
1 CAPSULE ORAL 2 TIMES DAILY
Status: DISCONTINUED | OUTPATIENT
Start: 2018-04-18 | End: 2018-04-20 | Stop reason: HOSPADM

## 2018-04-18 RX ORDER — OXYTOCIN/0.9 % SODIUM CHLORIDE 30/500 ML
340 PLASTIC BAG, INJECTION (ML) INTRAVENOUS CONTINUOUS PRN
Status: DISCONTINUED | OUTPATIENT
Start: 2018-04-18 | End: 2018-04-20 | Stop reason: HOSPADM

## 2018-04-18 RX ORDER — NALOXONE HYDROCHLORIDE 0.4 MG/ML
.1-.4 INJECTION, SOLUTION INTRAMUSCULAR; INTRAVENOUS; SUBCUTANEOUS
Status: DISCONTINUED | OUTPATIENT
Start: 2018-04-18 | End: 2018-04-20 | Stop reason: HOSPADM

## 2018-04-18 RX ORDER — LIDOCAINE HYDROCHLORIDE 10 MG/ML
INJECTION, SOLUTION INFILTRATION; PERINEURAL PRN
Status: DISCONTINUED | OUTPATIENT
Start: 2018-04-18 | End: 2018-04-18

## 2018-04-18 RX ORDER — OXYTOCIN 10 [USP'U]/ML
10 INJECTION, SOLUTION INTRAMUSCULAR; INTRAVENOUS
Status: DISCONTINUED | OUTPATIENT
Start: 2018-04-18 | End: 2018-04-20 | Stop reason: HOSPADM

## 2018-04-18 RX ORDER — ACETAMINOPHEN 325 MG/1
650 TABLET ORAL EVERY 4 HOURS PRN
Status: DISCONTINUED | OUTPATIENT
Start: 2018-04-18 | End: 2018-04-20 | Stop reason: HOSPADM

## 2018-04-18 RX ORDER — FENTANYL CITRATE 50 UG/ML
50-100 INJECTION, SOLUTION INTRAMUSCULAR; INTRAVENOUS ONCE
Status: COMPLETED | OUTPATIENT
Start: 2018-04-18 | End: 2018-04-18

## 2018-04-18 RX ORDER — BISACODYL 10 MG
10 SUPPOSITORY, RECTAL RECTAL DAILY PRN
Status: DISCONTINUED | OUTPATIENT
Start: 2018-04-20 | End: 2018-04-20 | Stop reason: HOSPADM

## 2018-04-18 RX ORDER — IBUPROFEN 800 MG/1
800 TABLET, FILM COATED ORAL EVERY 6 HOURS PRN
Status: DISCONTINUED | OUTPATIENT
Start: 2018-04-18 | End: 2018-04-20 | Stop reason: HOSPADM

## 2018-04-18 RX ORDER — OXYTOCIN/0.9 % SODIUM CHLORIDE 30/500 ML
1-24 PLASTIC BAG, INJECTION (ML) INTRAVENOUS CONTINUOUS
Status: DISCONTINUED | OUTPATIENT
Start: 2018-04-18 | End: 2018-04-20 | Stop reason: HOSPADM

## 2018-04-18 RX ORDER — FENTANYL CITRATE 50 UG/ML
INJECTION, SOLUTION INTRAMUSCULAR; INTRAVENOUS
Status: COMPLETED
Start: 2018-04-18 | End: 2018-04-18

## 2018-04-18 RX ORDER — AMOXICILLIN 250 MG
2 CAPSULE ORAL 2 TIMES DAILY
Status: DISCONTINUED | OUTPATIENT
Start: 2018-04-18 | End: 2018-04-20 | Stop reason: HOSPADM

## 2018-04-18 RX ORDER — NALBUPHINE HYDROCHLORIDE 10 MG/ML
2.5-5 INJECTION, SOLUTION INTRAMUSCULAR; INTRAVENOUS; SUBCUTANEOUS EVERY 6 HOURS PRN
Status: DISCONTINUED | OUTPATIENT
Start: 2018-04-18 | End: 2018-04-18 | Stop reason: RX

## 2018-04-18 RX ORDER — OXYTOCIN/0.9 % SODIUM CHLORIDE 30/500 ML
100 PLASTIC BAG, INJECTION (ML) INTRAVENOUS CONTINUOUS
Status: DISCONTINUED | OUTPATIENT
Start: 2018-04-18 | End: 2018-04-20 | Stop reason: HOSPADM

## 2018-04-18 RX ORDER — HYDROCORTISONE 2.5 %
CREAM (GRAM) TOPICAL 3 TIMES DAILY PRN
Status: DISCONTINUED | OUTPATIENT
Start: 2018-04-18 | End: 2018-04-20 | Stop reason: HOSPADM

## 2018-04-18 RX ORDER — BUPIVACAINE HYDROCHLORIDE 2.5 MG/ML
INJECTION, SOLUTION EPIDURAL; INFILTRATION; INTRACAUDAL PRN
Status: DISCONTINUED | OUTPATIENT
Start: 2018-04-18 | End: 2018-04-18

## 2018-04-18 RX ORDER — LANOLIN 100 %
OINTMENT (GRAM) TOPICAL
Status: DISCONTINUED | OUTPATIENT
Start: 2018-04-18 | End: 2018-04-20 | Stop reason: HOSPADM

## 2018-04-18 RX ORDER — LIDOCAINE HYDROCHLORIDE 10 MG/ML
INJECTION, SOLUTION EPIDURAL; INFILTRATION; INTRACAUDAL; PERINEURAL
Status: DISCONTINUED
Start: 2018-04-18 | End: 2018-04-18 | Stop reason: HOSPADM

## 2018-04-18 RX ORDER — MISOPROSTOL 200 UG/1
400 TABLET ORAL
Status: DISCONTINUED | OUTPATIENT
Start: 2018-04-18 | End: 2018-04-20 | Stop reason: HOSPADM

## 2018-04-18 RX ORDER — OXYCODONE HYDROCHLORIDE 5 MG/1
5 TABLET ORAL EVERY 4 HOURS PRN
Status: DISCONTINUED | OUTPATIENT
Start: 2018-04-18 | End: 2018-04-20 | Stop reason: HOSPADM

## 2018-04-18 RX ORDER — EPHEDRINE SULFATE 50 MG/ML
5 INJECTION, SOLUTION INTRAMUSCULAR; INTRAVENOUS; SUBCUTANEOUS
Status: DISCONTINUED | OUTPATIENT
Start: 2018-04-18 | End: 2018-04-20 | Stop reason: HOSPADM

## 2018-04-18 RX ADMIN — IBUPROFEN 800 MG: 800 TABLET ORAL at 20:49

## 2018-04-18 RX ADMIN — OXYTOCIN-SODIUM CHLORIDE 0.9% IV SOLN 30 UNIT/500ML 340 ML/HR: 30-0.9/5 SOLUTION at 08:09

## 2018-04-18 RX ADMIN — OXYTOCIN-SODIUM CHLORIDE 0.9% IV SOLN 30 UNIT/500ML 2 MILLI-UNITS/MIN: 30-0.9/5 SOLUTION at 02:09

## 2018-04-18 RX ADMIN — LIDOCAINE HYDROCHLORIDE AND EPINEPHRINE 45 MG: 15; 5 INJECTION, SOLUTION EPIDURAL at 04:09

## 2018-04-18 RX ADMIN — SENNOSIDES AND DOCUSATE SODIUM 1 TABLET: 8.6; 5 TABLET ORAL at 20:49

## 2018-04-18 RX ADMIN — BUPIVACAINE HYDROCHLORIDE 10 ML: 2.5 INJECTION, SOLUTION EPIDURAL; INFILTRATION; INTRACAUDAL at 06:59

## 2018-04-18 RX ADMIN — IBUPROFEN 800 MG: 800 TABLET ORAL at 14:50

## 2018-04-18 RX ADMIN — SODIUM CHLORIDE, POTASSIUM CHLORIDE, SODIUM LACTATE AND CALCIUM CHLORIDE: 600; 310; 30; 20 INJECTION, SOLUTION INTRAVENOUS at 02:09

## 2018-04-18 RX ADMIN — FENTANYL CITRATE 100 MCG: 50 INJECTION INTRAMUSCULAR; INTRAVENOUS at 05:46

## 2018-04-18 RX ADMIN — LIDOCAINE HYDROCHLORIDE AND EPINEPHRINE 30 MG: 15; 5 INJECTION, SOLUTION EPIDURAL at 04:10

## 2018-04-18 RX ADMIN — Medication 8 ML: at 04:11

## 2018-04-18 RX ADMIN — LIDOCAINE HYDROCHLORIDE 50 MG: 10 INJECTION, SOLUTION INFILTRATION; PERINEURAL at 02:21

## 2018-04-18 RX ADMIN — SODIUM CHLORIDE, POTASSIUM CHLORIDE, SODIUM LACTATE AND CALCIUM CHLORIDE: 600; 310; 30; 20 INJECTION, SOLUTION INTRAVENOUS at 04:33

## 2018-04-18 RX ADMIN — BUPIVACAINE HYDROCHLORIDE: 5 INJECTION, SOLUTION EPIDURAL; INTRACAUDAL; PERINEURAL at 04:15

## 2018-04-18 RX ADMIN — FENTANYL CITRATE 50 MCG: 50 INJECTION INTRAMUSCULAR; INTRAVENOUS at 06:04

## 2018-04-18 RX ADMIN — ONDANSETRON 4 MG: 2 INJECTION INTRAMUSCULAR; INTRAVENOUS at 06:43

## 2018-04-18 RX ADMIN — IBUPROFEN 800 MG: 800 TABLET ORAL at 08:42

## 2018-04-18 RX ADMIN — BUPIVACAINE HYDROCHLORIDE 10 ML/HR: 5 INJECTION, SOLUTION EPIDURAL; INTRACAUDAL; PERINEURAL at 04:12

## 2018-04-18 NOTE — PROVIDER NOTIFICATION
04/18/18 0131   Provider Notification   Provider Name/Title [facility]   Method of Notification Phone   Notification Reason Decels;Membrane Status;Labor Status;Uterine Activity;Pain;Status Update;SVE     Orders for pitocin placed. Will educate pt on pitocin augmentation and give educational hand out.

## 2018-04-18 NOTE — PLAN OF CARE
Problem: Patient Care Overview  Goal: Plan of Care/Patient Progress Review  Outcome: Improving  Data: Vital signs within normal limits. Postpartum checks within normal limits - see flow record. Patient eating and drinking normally. Patient has not voided since delivery and will call for assistance. . Patient instructed to call for assist when up..   No apparent signs of infection. Lac 2nd degree healing well. Patient is performing self cares and is able to care for infant. Positive attachment behaviors are observed with infant. Support persons are present.  Action:  Pain plan was discussed. Patient would like pain meds to be brought in when they are due. Patient was medicated during the shift for prophylactic pain management. See MAR.Patient education done about breastfeeding,  cares and postpartum cares. See flow record.  Response:   Patient reassessed within 1 hour after each medication for pain. Patient stated that pain had improved. Patient stated that she was comfortable. .   Plan: Anticipate discharge on 18.

## 2018-04-18 NOTE — PROGRESS NOTES
PT SVE relatively unchanged, 1.5/75/-2. Dr Trent paged to see if augmentation needed.     Valerie Jorgensen RN 4/18/2018 1:27 AM

## 2018-04-18 NOTE — L&D DELIVERY NOTE
Delivery Summary    Anna Hosler MRN# [id number]   Age: 22 year old YOB: 1995     ASSESSMENT & PLAN: Routine postpartum care.          Labor Event Times    Labor onset date:  18 Onset time:   6:00 PM   Dilation complete date:  18 Complete time:   7:00 AM   Start pushing date/time:  2018 0705            Labor Length    1st Stage (hrs):  13 (min):  0   2nd Stage (hrs):  0 (min):  18   3rd Stage (hrs):  0 (min):  10      Labor Events     labor?:  No   Labor Type:  Spontaneous, Augmentation   Predominate monitoring during 1st stage:  continuous electronic fetal monitoring      Antibiotics received during labor?:  No      Rupture date/time: 18 1800   Rupture type:  Spontaneous rupture of membranes occuring during spontaneous labor or augmentation   Fluid color:  Clear   Fluid odor:  Normal      Augmentation:  Oxytocin   Indications for augmentation:  Ineffective Contraction Pattern   1:1 continuous labor support provided by?:  RN          Delivery/Placenta Date and Time    Delivery Date:  18 Delivery Time:   7:18 AM   Placenta Date/Time:  2018  7:28 AM   Oxytocin given at the time of delivery:  after delivery of baby      Vaginal Counts    Initial count performed by 2 team members:   Two Team Members   Harley Richardson RN          Needles Suture Martin Sponges Instruments   Initial counts 2  5    Added to count  3 20    Final counts 2 3 25       Placed during labor Accounted for at the end of labor   NA NA   NA NA   NA NA      Final count performed by 2 team members:   Two Team Members   Dr Twan Pickens         Final count correct?:  Yes         Apgars    Living status:  Living    1 Minute 5 Minute 10 Minute 15 Minute 20 Minute   Skin color: 0  1       Heart rate: 2  2       Reflex irritability: 2  2       Muscle tone: 2  2       Respiratory effort: 2  2       Total: 8  9          Apgars assigned by:  SUBHASH RIVERA      Cord    Vessels:  3  "Vessels Complications:  None   Cord Blood Disposition:  Lab Gases Sent?:  No         [place] Resuscitation    Methods:  None          Measurements    Weight:  8 lb 4 oz Length:  1' 9.5\"   Head circumference:  12.8 cm       Skin to Skin and Feeding Plan    Skin to skin initiation date/time: 18 0718   Skin to skin with:  Mother   Skin to skin end date/time:     How do you plan to feed your baby:  Breastfeeding      Labor Events and Shoulder Dystocia    Fetal Tracing Prior to Delivery:  Category 1   Shoulder dystocia present?:  Neg            Delivery (Maternal) (Provider to Complete) (366557)    Episiotomy:  None   Perineal lacerations:  2nd Repaired?:  Yes   Labial laceration:  bilateral Repaired?:  Yes   Vaginal laceration?:  No    Cervical laceration?:  No    Est. blood loss (mL):  75   Number of repair packets:  3         Mother's Information  Mother: Hosler, Anna #[id number]    Start of Mother's Information     IO Blood Loss  18 1800 - 18 1441    Mom's I/O Activity            End of Mother's Information  Mother: Hosler, Anna #[id number]            Delivery - Provider to Complete (311267)    Attempted Delivery Types (Choose all that apply):  Spontaneous Vaginal Delivery   Delivery Type (Choose the 1 that will go to the Birth History):  Vaginal, Spontaneous Delivery                     Other personnel:   Provider Role   THIEN ALDANA Delivery Nurse   ARTURO HOSKINS Delivery Assist   KARLO RIVERA Delivery Assist            Placenta    Delayed Cord Clamping:  Done   Date/Time:  2018  7:28 AM   Removal:  Spontaneous      Anesthesia    Method:  Epidural, INTRAVENOUS REGIONAL   Cervical dilation at placement:  4-7         Presentation and Position    Presentation:  Vertex   Position:  Left Occiput Anterior                    Deb Trent MD       Helene is a 23y/o  female who presented to L&D on  after SROM at 1800.  Pregnancy uncomplicated GBS neg.  Short-term relief provided " by epidural.  Progressed rapidly during active labor and after pushing only 13min delivered an 8#4oz healthy baby girl onto the bed with RN in attendance on 4/18 at 0718.    Delayed cord clamping was done.  I arrived to pt's room ~1min after delivery and obtained cord blood.  I then oversaw spontaneous delivery of placenta.  I repaired a 2nd degree midline perineal tear in usual fashion with 3-0 Vicryl and bilateral labia minora tears adjacent to clitoris in usual fashion with 4-0 Vicryl under local anesthesia using 1% xylocaine.  Pt tolerated well.  EBL 75cc.  APGARs 8 and 9.  Baby breastfeeding well.  Mom and baby stable in room.      Deb Trent MD

## 2018-04-18 NOTE — PLAN OF CARE
Problem: Patient Care Overview  Goal: Plan of Care/Patient Progress Review  Outcome: Improving  Mother and baby transferred to postpartum room at 1100 via elva vargas and pts mother pushing NB in basinet. Patient oriented to room. Mother and baby bonding well and in stable condition upon transfer.  Data: Vital signs within normal limits. Postpartum checks within normal limits - see flow record. Patient eating and drinking normally. Patient able to empty bladder independently. . Patient instructed to call for assist when up. No apparent signs of infection. Lac 2nd degree healing well. Patient is performing self cares and is able to care for infant. Positive attachment behaviors are observed with infant. Support persons are present.  Action:  Pain plan was discussed. Patient would like pain meds to be brought in when they are due. Patient was medicated during the shift for prophylactic pain. See MAR.Patient education done about breastfeeding. See flow record.  Response:   Patient reassessed within 1 hour after each medication for pain. Patient stated that pain had improved. Patient stated that she was comfortable. .   Plan: Anticipate discharge on 4/20/18.

## 2018-04-18 NOTE — ANESTHESIA POSTPROCEDURE EVALUATION
Patient: Anna Hosler    * No procedures listed *    Diagnosis:* No pre-op diagnosis entered *  Diagnosis Additional Information: No value filed.    Anesthesia Type:  No value filed.    Note:  Anesthesia Post Evaluation    Patient location during evaluation: Bedside  Patient participation: Able to fully participate in evaluation  Level of consciousness: awake and alert  Pain management: adequate  Airway patency: patent  Cardiovascular status: acceptable  Respiratory status: acceptable  Hydration status: acceptable  PONV: none     Anesthetic complications: None          Last vitals:  Vitals:    04/18/18 0845 04/18/18 1120 04/18/18 1615   BP: 106/55 125/70 111/64   Resp:   16   Temp:   36.6  C (97.8  F)   SpO2:            Electronically Signed By: RAFAELA Rico CRNA  April 18, 2018  4:44 PM

## 2018-04-18 NOTE — H&P
Anna Hosler is a 22 year old  female at 39w6d gestation who presented to L&D leaking fluid since ~1800.  Shortly after began valentín Q2-3.  Membranes grossly ruptured upon arrival to L&D.  No other changes since last OB visit on 18.      Pregnancy history:   Associated hydronephrosis of pregnancy in mother this pregnancy- has been stable.  Also chronic back pain, has some scoliosis, home exercises and scheduled to work with physical therapy.  Baby girl Linda.  Work: Works with [employer],   Smoking/alcohol: N/A/A  Flu shot: Received.  Lab: Essentially normal.  FOB: Not actively involved, Patient living at home with Parents who are actively involved.  Estimated Date of Delivery: 18 by LMP and 2nd trim U/S  20 week USS: essentially normal  GTT: Passed  TDaP: Given  GBS: negative   Planning to breastfeed  Labor Pain Control: Epidural  Post partum contraception: Yet to be determined      /78  Temp 97.8  F (36.6  C) (Oral)  Resp 18  LMP 2017 (Exact Date)   [place]: Q2.5-3.5min  FHTs 120s with mod variability.  Accels not present.  No decels.    Cervix on admission 1.5/50%/-2 (per RN)    A/P: 1.  22 year old  female at 392w6d with SROM and spontaneous onset of labor.  GBS negative.  Consult anesthesia if pt desires.  Anticipate vaginal delivery.      Deb Trent MD

## 2018-04-18 NOTE — PROGRESS NOTES
Pt having rapid cervical change. SCE 5/95/0. Walker CRNA called for bolus. Orders placed for  mcg of fentayl IV. Will give 50 mcg first, then if pt not having good relief will give remaining 50 mcg.     Valerie Jorgensen RN 4/18/2018 5:41 AM

## 2018-04-18 NOTE — ANESTHESIA PROCEDURE NOTES
Peripheral nerve/Neuraxial procedure note : epidural catheter  Pre-Procedure  Performed by  ABBY WORTHINGTON   Location: OB    Procedure Times:4/18/2018 2:19 AM and 4/18/2018 2:31 AM  Pre-Anesthestic Checklist: patient identified, IV checked, risks and benefits discussed, informed consent, monitors and equipment checked, pre-op evaluation and at physician/surgeon's request    Timeout  Correct Patient: Yes   Correct Procedure: Yes   Correct Site: Yes   Correct Laterality: N/A   Correct Position: Yes   Site Marked: N/A   .   Procedure Documentation    .    Procedure:    Epidural catheter.  Insertion Site:L4-5  (midline approach) Injection technique: LORT saline   Local skin infiltrated with 5 mL of 1% lidocaine.  MOO at 5 cm     Patient Prep;mask, sterile gloves, patient draped.  .  Needle: Touhy needle Needle Gauge: 17.    Needle Length (Inches) 3.5  # of attempts: 1 and # of redirects:  .   Catheter: 19 G . .  Catheter threaded easily  .  12 cm at skin.   .    Assessment/Narrative  Paresthesias: No.  .  .  Aspiration negative for heme or CSF  . Test dose of 5 mL lidocaine 1.5% w/ 1:200,000 epinephrine at 04:09. . Comments:  VAS pain score prior to epidural:10/10    VAS pain score after epidural:0/10    Pt. Tolerated well, FHR stable.

## 2018-04-18 NOTE — PROGRESS NOTES
Prime 39+6 [facility] 1935 stating she has been leaking fluid since about 1800 and started to contract shortly after. Pt is here with her mother FOB isn't involved. Pt is grossly ruptured, clear fluid. SVE 1.5/50/-1 no bloody show. Pt is valentín 2-3 min apart and pt is breathing through them. Cat 1 tracing. Orders obtained. Will place and IV. Orientated to plan of care. Call light in reach. Will cont to monitor.

## 2018-04-18 NOTE — PROVIDER NOTIFICATION
04/18/18 0626   Provider Notification   Provider Name/Title Slings   Method of Notification Phone   Notification Reason Labor Status;Uterine Activity;Pain;Status Update;SVE

## 2018-04-18 NOTE — PROGRESS NOTES
Pt having good relief after IV fentanyl. Pt states she is comfortable. Will continue to monitor.     Valerie Jorgensen RN 4/18/2018 5:56 AM

## 2018-04-18 NOTE — PROGRESS NOTES
Pt coping with labor, breathing thorough contractions, but declines medication intervention at this time. Pt will inform staff when needing intervention. Pt ambulating, using birthing ball and sitting at edge of bed. Leaking moderate amounts of clear amniotic fluid, denies bloody show. Contractions every 3-5 minutes lasting 60-90 seconds, palpate moderate. Vitals stable, no temp. FHT category 2, baseline 130, + accels, moderate variability, occasional VD. Pt declines SVE at this time. Will continue to monitor.     Valerie Jorgensen RN 4/18/2018 12:14 AM

## 2018-04-18 NOTE — PLAN OF CARE
Problem: Labor (Cervical Ripen, Induct, Augment) (Adult,Obstetrics,Pediatric)  Goal: Signs and Symptoms of Listed Potential Problems Will be Absent, Minimized or Managed (Labor)  Signs and symptoms of listed potential problems will be absent, minimized or managed by discharge/transition of care (reference Labor (Cervical Ripen, Induct, Augment) (Adult,Obstetrics,Pediatric) CPG).   Outcome: Improving  SVE 5/90/0. Pt comfortable at this point. Vitals stable. Leaking small amounts of clear fluid. FHT category 1, baseline 125, + accels, - decels, moderate variability. Contractions every 2-3 minutes, lasting 60-90 seconds, moderate to palpation. Pt educated on laboring down, agreeable as long as pain tolerable. Dr Trent updated.     Valerie Jorgensen RN 4/18/2018 6:29 AM

## 2018-04-18 NOTE — ANESTHESIA PREPROCEDURE EVALUATION
Anesthesia Evaluation       history and physical reviewed .             ROS/MED HX    ENT/Pulmonary:  - neg pulmonary ROS     Neurologic:  - neg neurologic ROS     Cardiovascular:  - neg cardiovascular ROS       METS/Exercise Tolerance:     Hematologic:         Musculoskeletal:         GI/Hepatic:  - neg GI/hepatic ROS       Renal/Genitourinary:         Endo:         Psychiatric:         Infectious Disease:         Malignancy:         Other:                     Physical Exam  Normal systems: cardiovascular, pulmonary and dental    Airway   Mallampati: II  TM distance: > 3 FB  Neck ROM: full  Mouth opening: > 3 cm    Dental     Cardiovascular       Pulmonary           neg OB ROS                 Anesthesia Plan      History & Physical Review      ASA Status:  .  OB Epidural Asa: 2            Postoperative Care      Consents  Anesthetic plan, risks, benefits and alternatives discussed with:  Patient..                          .

## 2018-04-18 NOTE — PROGRESS NOTES
Consent signed, time out done, epidural placed with out issues. Will hold off on dosing epidural until pt requests.     Valerie Jorgensen RN 4/18/2018 2:36 AM

## 2018-04-18 NOTE — PLAN OF CARE
Problem: Postpartum (Vaginal Delivery) (Adult,Obstetrics,Pediatric)  Goal: Signs and Symptoms of Listed Potential Problems Will be Absent, Minimized or Managed (Postpartum)  Signs and symptoms of listed potential problems will be absent, minimized or managed by discharge/transition of care (reference Postpartum (Vaginal Delivery) (Adult,Obstetrics,Pediatric) CPG).  Outcome: Improving  S:Delivery  B:Spontaneous Labor,Unknown    No results found for: GBS with antibiotic treatment not indicated 4 hours prior to delivery.  A: Patient delivered  lac 2nd degree at 0718 with Dr. CLARK Trent in attendance and baby placed on mother's abdomen for delayed cord clamping. Baby dried and stimulated. Baby placed  skin to skin @ 0718.. Apgars 7/9.  IV infusion of Oxytocin  infused. Placenta removal spontaneous. See Flowsheet for VS and PP checks. Labor care plan goals met, transition now to postpartum care.  R: Expect routine postpartum care. Anticipate first feeding within the hour or whenever infant displays feeding cues. Continue skin to skin. Prior discussion with mother indicates that feeding plan is Breast feeding . Educated mother on importance of exclusive breastfeeding, expected feeding readiness cues and encouraged her to observe for these cues while rooming in. Informed her that breastfeeding assistance would be provided.    Valerie Jorgensen RN 2018 7:38 AM

## 2018-04-18 NOTE — PLAN OF CARE
Problem: Patient Care Overview  Goal: Plan of Care/Patient Progress Review  Outcome: Improving  Pt is following her postpartum pathway with VSS, assessments WNL, bonding with her baby. Pt's mother is present, involved in baby cares. Pt soaked in tub and napped today. Pt reports adequate pain relief with oral pain medications.

## 2018-04-18 NOTE — PROGRESS NOTES
Pt time out done, Epidural test dose/bolus given by Walker CRNA and patient tolerated well. Patient rates her pain after procedure as 0/10. Ephedrine Not given .    Valerie Jorgensen RN 4/18/2018 4:57 AM

## 2018-04-19 LAB — HGB BLD-MCNC: 10.8 G/DL (ref 11.7–15.7)

## 2018-04-19 PROCEDURE — 36415 COLL VENOUS BLD VENIPUNCTURE: CPT | Performed by: FAMILY MEDICINE

## 2018-04-19 PROCEDURE — 25000132 ZZH RX MED GY IP 250 OP 250 PS 637: Performed by: FAMILY MEDICINE

## 2018-04-19 PROCEDURE — 85018 HEMOGLOBIN: CPT | Performed by: FAMILY MEDICINE

## 2018-04-19 PROCEDURE — 12000027 ZZH R&B OB

## 2018-04-19 RX ADMIN — IBUPROFEN 800 MG: 800 TABLET ORAL at 02:55

## 2018-04-19 RX ADMIN — IBUPROFEN 800 MG: 800 TABLET ORAL at 15:23

## 2018-04-19 RX ADMIN — IBUPROFEN 800 MG: 800 TABLET ORAL at 09:05

## 2018-04-19 RX ADMIN — SENNOSIDES AND DOCUSATE SODIUM 1 TABLET: 8.6; 5 TABLET ORAL at 08:36

## 2018-04-19 RX ADMIN — IBUPROFEN 800 MG: 800 TABLET ORAL at 22:29

## 2018-04-19 NOTE — PLAN OF CARE
Problem: Postpartum (Vaginal Delivery) (Adult,Obstetrics,Pediatric)  Goal: Signs and Symptoms of Listed Potential Problems Will be Absent, Minimized or Managed (Postpartum)  Signs and symptoms of listed potential problems will be absent, minimized or managed by discharge/transition of care (reference Postpartum (Vaginal Delivery) (Adult,Obstetrics,Pediatric) CPG).   Outcome: Improving  Data: Vital signs within normal limits. Postpartum checks within normal limits - see flow record. Patient eating and drinking normally. Patient able to empty bladder independently. . Patient ambulating independently..   No apparent signs of infection. Lac 2nd degree healing well. Patient Is performing self cares and Is able to care for infant. Positive attachment behaviors are observed with infant. Support persons are present.  Action:  Pain plan was discussed. Patient would like pain meds to be brought in when they are due. Patient was medicated during the shift for cramping. See MAR.Patient education done about hand hygiene, breastfeeding,  cares, postpartum cares, pain management/plan,  safety and rest. See flow record.  Response:   Patient reassessed within 1 hour after each medication for pain. Patient stated that pain had improved. Patient stated that she was comfortable. .   Plan: Anticipate discharge on .    Valerie Jorgensen RN 2018 5:39 AM

## 2018-04-19 NOTE — DISCHARGE SUMMARY
[facility] Obstetrics Post-Partum Progress Note          Assessment and Plan:    Assessment:   Post-partum day #1  Normal spontaneous vaginal delivery  L&D complications: None      Doing well.  Pain well-controlled.      Plan:   Ambulation encouraged  Breast feeding strategies discussed           Interval History:   Doing well.  Pain is well-controlled.  No fevers.  No history of foul-smelling vaginal discharge.  Good appetite.  Denies chest pain, shortness of breath, nausea or vomiting.  Vaginal bleeding is similar to a heavy menstrual flow.  Ambulatory.  Breastfeeding well.          Significant Problems:    None          Review of Systems:    The patient denies any chest pain, shortness of breath, excessive pain, fever, chills, purulent drainage from the wound, nausea or vomiting.          Medications:   All medications related to the patient's surgery have been reviewed          Physical Exam:   All vitals stable  Temp: 99.1  F (37.3  C) Temp src: Oral BP: 124/66   Heart Rate: 82 Resp: 16        Uterine fundus is firm, non-tender and at the level of the umbilicus          Data:     All laboratory data related to this surgery reviewed  Hemoglobin   Date Value Ref Range Status   04/19/2018 10.8 (L) 11.7 - 15.7 g/dL Final   02/02/2018 11.8 11.7 - 15.7 g/dL Final   11/20/2017 12.2 11.7 - 15.7 g/dL Final   08/21/2016 13.2 11.7 - 15.7 g/dL Final     No imaging studies have been ordered    Deb Trent MD

## 2018-04-19 NOTE — PLAN OF CARE
Problem: Patient Care Overview  Goal: Plan of Care/Patient Progress Review  Outcome: Improving  Pt was given the option of discharging to home today but has decided to stay one more day for completion of education, resting and additional support of staff with her . VSS, assessments are WNL and pt reports adequate pain control using Ibuprofen.

## 2018-04-19 NOTE — PLAN OF CARE
Problem: Postpartum (Vaginal Delivery) (Adult,Obstetrics,Pediatric)  Goal: Signs and Symptoms of Listed Potential Problems Will be Absent, Minimized or Managed (Postpartum)  Signs and symptoms of listed potential problems will be absent, minimized or managed by discharge/transition of care (reference Postpartum (Vaginal Delivery) (Adult,Obstetrics,Pediatric) CPG).   Outcome: Therapy, progress toward functional goals as expected  Pt vss, afebrile.  Taking Ibup prn discomfort.  Breastfeeding indep.  Indep with self & infant cares.  Pt stable.

## 2018-04-20 VITALS
DIASTOLIC BLOOD PRESSURE: 62 MMHG | SYSTOLIC BLOOD PRESSURE: 112 MMHG | OXYGEN SATURATION: 99 % | TEMPERATURE: 98.9 F | HEART RATE: 84 BPM | RESPIRATION RATE: 16 BRPM

## 2018-04-20 PROCEDURE — 25000132 ZZH RX MED GY IP 250 OP 250 PS 637: Performed by: FAMILY MEDICINE

## 2018-04-20 RX ADMIN — IBUPROFEN 800 MG: 800 TABLET ORAL at 08:14

## 2018-04-20 RX ADMIN — SENNOSIDES AND DOCUSATE SODIUM 1 TABLET: 8.6; 5 TABLET ORAL at 08:15

## 2018-04-20 NOTE — PROGRESS NOTES
"Pain POC, pt declines to be woken for pain medication tonight.  States, \"will call when needed.\"  Ibuprofen was given around 2230.  "

## 2018-04-20 NOTE — PLAN OF CARE
Problem: Postpartum (Vaginal Delivery) (Adult,Obstetrics,Pediatric)  Goal: Signs and Symptoms of Listed Potential Problems Will be Absent, Minimized or Managed (Postpartum)  Signs and symptoms of listed potential problems will be absent, minimized or managed by discharge/transition of care (reference Postpartum (Vaginal Delivery) (Adult,Obstetrics,Pediatric) CPG).   Outcome: Improving  Data: Vital signs within normal limits. Postpartum checks within normal limits - see flow record. Patient eating and drinking normally. Patient able to empty bladder independently. . Patient ambulating independently..   No apparent signs of infection. Patient Is performing self cares and Is able to care for infant. Positive attachment behaviors are observed with infant. Support persons are present.  Action:  Pain plan was discussed. Patient will request pain med when she is ready for it. Patient was medicated during the shift for cramping. See MAR.Patient education done about breastfeeding,  cares, postpartum cares, pain management/plan,  safety and rest. See flow record.  Response:   Patient reassessed within 1 hour after each medication for pain. Patient stated that pain had improved. Patient stated that she was comfortable. .   Plan: Anticipate discharge on 2018.

## 2018-04-20 NOTE — PROGRESS NOTES
[facility] Obstetrics Post-Partum Progress Note          Assessment and Plan:    Assessment:   Post-partum day #1  Normal spontaneous vaginal delivery  L&D complications: None      Doing well.  Pain well-controlled.      Plan:   Ambulation encouraged  Breast feeding strategies discussed           Interval History:   Doing well.  Pain is well-controlled.  No fevers.  No history of foul-smelling vaginal discharge.  Good appetite.  Denies chest pain, shortness of breath, nausea or vomiting.  Vaginal bleeding is similar to a heavy menstrual flow.  Ambulatory.  Breastfeeding well.          Significant Problems:    None          Review of Systems:    The patient denies any chest pain, shortness of breath, excessive pain, fever, chills, purulent drainage from the wound, nausea or vomiting.          Medications:   All medications related to the patient's surgery have been reviewed          Physical Exam:   All vitals stable  Temp: 99.1  F (37.3  C) Temp src: Oral BP: 124/66   Heart Rate: 82 Resp: 16        Uterine fundus is firm, non-tender and at the level of the umbilicus          Data:     All laboratory data related to this surgery reviewed  Hemoglobin   Date Value Ref Range Status   04/19/2018 10.8 (L) 11.7 - 15.7 g/dL Final   02/02/2018 11.8 11.7 - 15.7 g/dL Final   11/20/2017 12.2 11.7 - 15.7 g/dL Final   08/21/2016 13.2 11.7 - 15.7 g/dL Final     No imaging studies have been ordered    Deb Trent MD

## 2018-04-20 NOTE — DISCHARGE SUMMARY
[facility] Discharge Summary    Anna Hosler MRN# [id number]   Age: 22 year old YOB: 1995     Date of Admission:  2018  Date of Discharge::  2018   Admitting Physician:  Deb Trent MD  Discharge Physician:  Deb Trent MD     Home clinic: [facility]           Admission Diagnoses:   Pregnancy          Discharge Diagnosis:   Normal spontaneous vaginal delivery  Intrauterine pregnancy at 40 weeks gestation          Procedures:   Procedure(s): Repair of second degree perineal laceration  Repair of bilateral labial lacerations        No procedures performed during this admission           Medications Prior to Admission:     Prescriptions Prior to Admission   Medication Sig Dispense Refill Last Dose     Acetaminophen (TYLENOL PO) Take 500 mg by mouth every 6 hours as needed for mild pain or fever   Past Week at Unknown time     Prenatal Vit-Fe Fumarate-FA (PRENATAL MULTIVITAMIN PLUS IRON) 27-0.8 MG TABS per tablet Take 1 tablet by mouth daily   2018 at Unknown time     budesonide (RINOCORT AQUA) 32 MCG/ACT spray Spray 1 spray into both nostrils daily   Unknown at Unknown time             Discharge Medications:     Current Discharge Medication List      CONTINUE these medications which have NOT CHANGED    Details   Acetaminophen (TYLENOL PO) Take 500 mg by mouth every 6 hours as needed for mild pain or fever      Prenatal Vit-Fe Fumarate-FA (PRENATAL MULTIVITAMIN PLUS IRON) 27-0.8 MG TABS per tablet Take 1 tablet by mouth daily      budesonide (RINOCORT AQUA) 32 MCG/ACT spray Spray 1 spray into both nostrils daily                   Consultations:   No consultations were requested during this admission          Brief History of Labor:      Helene is a 23y/o  female who presented to L&D on  after SROM at 1800.  Pregnancy uncomplicated GBS neg.  Short-term relief provided by epidural.  Progressed rapidly during active labor and after pushing only 13min delivered  an 8#4oz healthy baby girl onto the bed with RN in attendance on 4/18 at 0718.    Delayed cord clamping was done.  I arrived to pt's room ~1min after delivery and obtained cord blood.  I then oversaw spontaneous delivery of placenta.  I repaired a 2nd degree midline perineal tear in usual fashion with 3-0 Vicryl and bilateral labia minora tears adjacent to clitoris in usual fashion with 4-0 Vicryl under local anesthesia using 1% xylocaine.  Pt tolerated well.  EBL 75cc.  APGARs 8 and 9.             Hospital Course:   The patient's hospital course was unremarkable.  On discharge, her pain was well controlled. Vaginal bleeding is similar to peak menstrual flow.  Voiding without difficulty.  Ambulating well and tolerating a normal diet.  No fever.  Breastfeeding well.  Infant is stable.  No bowel movement yet. She was discharged on post-partum day #2.    Exam: /66  Temp 99.1  F (37.3  C) (Oral)  Resp 16  LMP 05/20/2017 (Exact Date)  SpO2 99%  Breastfeeding? Unknown   Gen: alert, oriented.  Bonding with baby   Fundus: firm 2FB below umbilicus   Psych: euthymic mood. Appropriate affect     Post-partum hemoglobin:   Hemoglobin   Date Value Ref Range Status   04/19/2018 10.8 (L) 11.7 - 15.7 g/dL Final             Discharge Instructions and Follow-Up:   Discharge diet: Regular   Discharge activity: Pelvic rest: abstain from intercourse and do not use tampons for 6 week(s)   Discharge follow-up: Follow up with primary care provider in 6 weeks   Wound care: Drink plenty of fluids  Warm baths as needed for perineal discomfort            Discharge Disposition:   Discharged to home      Attestation:  I have reviewed today's vital signs, notes, medications, labs and imaging.    Deb Trent MD

## 2018-04-22 ENCOUNTER — HOSPITAL ENCOUNTER (EMERGENCY)
Facility: CLINIC | Age: 23
Discharge: HOME | End: 2018-04-22
Attending: EMERGENCY MEDICINE | Admitting: EMERGENCY MEDICINE
Payer: COMMERCIAL

## 2018-04-22 VITALS
OXYGEN SATURATION: 97 % | RESPIRATION RATE: 18 BRPM | DIASTOLIC BLOOD PRESSURE: 65 MMHG | HEART RATE: 113 BPM | TEMPERATURE: 99.3 F | SYSTOLIC BLOOD PRESSURE: 119 MMHG

## 2018-04-22 DIAGNOSIS — R20.2 PARESTHESIAS: ICD-10-CM

## 2018-04-22 LAB
ANION GAP SERPL CALCULATED.3IONS-SCNC: 7 MMOL/L (ref 3–14)
BASOPHILS # BLD AUTO: 0 10E9/L (ref 0–0.2)
BASOPHILS NFR BLD AUTO: 0.2 %
BUN SERPL-MCNC: 14 MG/DL (ref 7–30)
CALCIUM SERPL-MCNC: 8.7 MG/DL (ref 8.5–10.1)
CHLORIDE SERPL-SCNC: 109 MMOL/L (ref 94–109)
CO2 SERPL-SCNC: 24 MMOL/L (ref 20–32)
CREAT SERPL-MCNC: 0.59 MG/DL (ref 0.52–1.04)
DIFFERENTIAL METHOD BLD: ABNORMAL
EOSINOPHIL # BLD AUTO: 0.2 10E9/L (ref 0–0.7)
EOSINOPHIL NFR BLD AUTO: 1.8 %
ERYTHROCYTE [DISTWIDTH] IN BLOOD BY AUTOMATED COUNT: 13.4 % (ref 10–15)
GFR SERPL CREATININE-BSD FRML MDRD: >90 ML/MIN/1.7M2
GLUCOSE SERPL-MCNC: 95 MG/DL (ref 70–99)
HCT VFR BLD AUTO: 36 % (ref 35–47)
HGB BLD-MCNC: 12.2 G/DL (ref 11.7–15.7)
IMM GRANULOCYTES # BLD: 0 10E9/L (ref 0–0.4)
IMM GRANULOCYTES NFR BLD: 0.2 %
LYMPHOCYTES # BLD AUTO: 1.9 10E9/L (ref 0.8–5.3)
LYMPHOCYTES NFR BLD AUTO: 14.2 %
MCH RBC QN AUTO: 32.9 PG (ref 26.5–33)
MCHC RBC AUTO-ENTMCNC: 33.9 G/DL (ref 31.5–36.5)
MCV RBC AUTO: 97 FL (ref 78–100)
MONOCYTES # BLD AUTO: 0.9 10E9/L (ref 0–1.3)
MONOCYTES NFR BLD AUTO: 7 %
NEUTROPHILS # BLD AUTO: 10 10E9/L (ref 1.6–8.3)
NEUTROPHILS NFR BLD AUTO: 76.6 %
PLATELET # BLD AUTO: 197 10E9/L (ref 150–450)
POTASSIUM SERPL-SCNC: 3.8 MMOL/L (ref 3.4–5.3)
RBC # BLD AUTO: 3.71 10E12/L (ref 3.8–5.2)
SODIUM SERPL-SCNC: 140 MMOL/L (ref 133–144)
TSH SERPL DL<=0.005 MIU/L-ACNC: 1.72 MU/L (ref 0.4–4)
WBC # BLD AUTO: 13.1 10E9/L (ref 4–11)

## 2018-04-22 PROCEDURE — 99283 EMERGENCY DEPT VISIT LOW MDM: CPT | Performed by: EMERGENCY MEDICINE

## 2018-04-22 PROCEDURE — 84443 ASSAY THYROID STIM HORMONE: CPT | Performed by: EMERGENCY MEDICINE

## 2018-04-22 PROCEDURE — 80048 BASIC METABOLIC PNL TOTAL CA: CPT | Performed by: EMERGENCY MEDICINE

## 2018-04-22 PROCEDURE — 99284 EMERGENCY DEPT VISIT MOD MDM: CPT | Mod: Z6 | Performed by: EMERGENCY MEDICINE

## 2018-04-22 PROCEDURE — 85025 COMPLETE CBC W/AUTO DIFF WBC: CPT | Performed by: EMERGENCY MEDICINE

## 2018-04-22 NOTE — ED AVS SNAPSHOT
[facility] Emergency Department    [address] [address]    Phone:  [phone]    Fax:  [phone]                                       Anna Hosler   MRN: [id number]    Department:  [facility] Emergency Department   Date of Visit:  4/22/2018           Patient Information     Date Of Birth          1995        Your diagnoses for this visit were:     Postpartum state     Paresthesias        You were seen by Antione Pickens DO.        Discharge Instructions       Cardiac, pulmonary, abdomen, neurological examinations were all normal  All blood work normal.  Elevated white blood cell count is normal for the first few weeks postpartum.  White blood cell count starts climbing in the second trimester is a normal response of pregnancy.  No medical complications including no sign for infection at this time.    Monitor for development of any new symptoms.  If noted return to the emergency department or follow-up in the clinic    Thyroid testing results were normal    24 Hour Appointment Hotline       To make an appointment at any [facility] clinic, call [phone] ([phone]). If you don't have a family doctor or clinic, we will help you find one. [facility] clinics are conveniently located to serve the needs of you and your family.             Review of your medicines      Our records show that you are taking the medicines listed below. If these are incorrect, please call your family doctor or clinic.        Dose / Directions Last dose taken    budesonide 32 MCG/ACT spray   Commonly known as:  RINOCORT AQUA   Dose:  1 spray        Spray 1 spray into both nostrils daily   Refills:  0        prenatal multivitamin plus iron 27-0.8 MG Tabs per tablet   Dose:  1 tablet        Take 1 tablet by mouth daily   Refills:  0        TYLENOL PO   Dose:  500 mg        Take 500 mg by mouth every 6 hours as needed for mild pain or fever   Refills:  0                Procedures and tests  performed during your visit     Basic metabolic panel    CBC with platelets differential    TSH      Orders Needing Specimen Collection     None      Pending Results     No orders found from 4/20/2018 to 4/23/2018.            Pending Culture Results     No orders found from 4/20/2018 to 4/23/2018.            Pending Results Instructions     If you had any lab results that were not finalized at the time of your Discharge, you can call the ED Lab Result RN at [phone]. You will be contacted by this team for any positive Lab results or changes in treatment. The nurses are available 7 days a week from 10A to 6:30P.  You can leave a message 24 hours per day and they will return your call.        Test Results From Your Hospital Stay        4/22/2018  9:27 PM      Component Results     Component Value Ref Range & Units Status    WBC 13.1 (H) 4.0 - 11.0 10e9/L Final    RBC Count 3.71 (L) 3.8 - 5.2 10e12/L Final    Hemoglobin 12.2 11.7 - 15.7 g/dL Final    Hematocrit 36.0 35.0 - 47.0 % Final    MCV 97 78 - 100 fl Final    MCH 32.9 26.5 - 33.0 pg Final    MCHC 33.9 31.5 - 36.5 g/dL Final    RDW 13.4 10.0 - 15.0 % Final    Platelet Count 197 150 - 450 10e9/L Final    Diff Method Automated Method  Final    % Neutrophils 76.6 % Final    % Lymphocytes 14.2 % Final    % Monocytes 7.0 % Final    % Eosinophils 1.8 % Final    % Basophils 0.2 % Final    % Immature Granulocytes 0.2 % Final    Absolute Neutrophil 10.0 (H) 1.6 - 8.3 10e9/L Final    Absolute Lymphocytes 1.9 0.8 - 5.3 10e9/L Final    Absolute Monocytes 0.9 0.0 - 1.3 10e9/L Final    Absolute Eosinophils 0.2 0.0 - 0.7 10e9/L Final    Absolute Basophils 0.0 0.0 - 0.2 10e9/L Final    Abs Immature Granulocytes 0.0 0 - 0.4 10e9/L Final         4/22/2018  9:44 PM      Component Results     Component Value Ref Range & Units Status    Sodium 140 133 - 144 mmol/L Final    Potassium 3.8 3.4 - 5.3 mmol/L Final    Chloride 109 94 - 109 mmol/L Final    Carbon Dioxide 24 20 - 32  "mmol/L Final    Anion Gap 7 3 - 14 mmol/L Final    Glucose 95 70 - 99 mg/dL Final    Urea Nitrogen 14 7 - 30 mg/dL Final    Creatinine 0.59 0.52 - 1.04 mg/dL Final    GFR Estimate >90 >60 mL/min/1.7m2 Final    Non  GFR Calc    GFR Estimate If Black >90 >60 mL/min/1.7m2 Final    African American GFR Calc    Calcium 8.7 8.5 - 10.1 mg/dL Final         2018  9:55 PM      Component Results     Component Value Ref Range & Units Status    TSH 1.72 0.40 - 4.00 mU/L Final                Thank you for choosing [facility]       Thank you for choosing [facility] for your care. Our goal is always to provide you with excellent care. Hearing back from our patients is one way we can continue to improve our services. Please take a few minutes to complete the written survey that you may receive in the mail after you visit with us. Thank you!        [facility] [employer] Information     [employer] lets you send messages to your doctor, view your test results, renew your prescriptions, schedule appointments and more. To sign up, go to [email or web address] . Click on \"Log in\" on the left side of the screen, which will take you to the Welcome page. Then click on \"Sign up Now\" on the right side of the page.     You will be asked to enter the access code listed below, as well as some personal information. Please follow the directions to create your username and password.     Your access code is: [email or web address]  Expires: 2018  6:17 PM     Your access code will  in 90 days. If you need help or a new code, please call your [facility] clinic or [phone].        Care EveryWhere ID     This is your Care EveryWhere ID. This could be used by other organizations to access your [facility] medical records  [id number]        Equal Access to Services     SINDHU BROCK : Brenda Will, waangelda luibisadaha, qaybta kaalmada kan, lane bui. So [facility] [phone].    ATENCIÓN: Si habla español, " tiene a ludwig disposición servicios gratuitos de asistencia lingüística. Lledyta al [phone].    We comply with applicable federal civil rights laws and [place] laws. We do not discriminate on the basis of race, color, national origin, age, disability, sex, sexual orientation, or gender identity.            After Visit Summary       This is your record. Keep this with you and show to your community pharmacist(s) and doctor(s) at your next visit.

## 2018-04-22 NOTE — ED AVS SNAPSHOT
[facility] Emergency Department    [address] [address]    Phone:  [phone]    Fax:  [phone]                                       Anna Hosler   MRN: [id number]    Department:  [facility] Emergency Department   Date of Visit:  4/22/2018           After Visit Summary Signature Page     I have received my discharge instructions, and my questions have been answered. I have discussed any challenges I see with this plan with the nurse or doctor.    ..........................................................................................................................................  Patient/Patient Representative Signature      ..........................................................................................................................................  Patient Representative Print Name and Relationship to Patient    ..................................................               ................................................  Date                                            Time    ..........................................................................................................................................  Reviewed by Signature/Title    ...................................................              ..............................................  Date                                                            Time

## 2018-04-23 NOTE — ED NOTES
"Noticed \"tingling sensation on top of bilat arms across chest and both legs about 2.5-3hrs ago. No dizziness no nausea   4 days postpartum uncomplicated delivery. Did have epidural  No BM in 2 days no babatunde softeners since leaving hospital  Denies urinary changes/concerns no fevers  Is in no distress presents with her mother who does most of the answering for pt       "

## 2018-04-23 NOTE — ED PROVIDER NOTES
History     Chief Complaint   Patient presents with     Flank Pain     4 days postpartum     Numbness     tingling in chest, arms and legs     HPI  Anna Hosler is a 22 year old female who is 4 days postpartum.  Uncomplicated vaginal delivery.  Prenatal care was complicated by ultrasound confirming hydronephrosis with severe flank and low back pain.  Wanting to proceed with CT imaging they proceeded with general anesthesia a 27-28 weeks for urologic procedure which identified no stones.  Patient's primary reason this evening coming in is because of transient upper chest paresthesias as well as bilateral arms and bilateral legs.  Not accompanied by other symptoms.  Did note some petechiae on neck and upper chest which were visible shortly after delivery.  Obstetrics team felt this was related to Valsalva as part of pushing.  There has been no fever or chills. Lochia is not foul and only changing pads every 2-4 hours.  Breast-feeding successfully.  Normal appetite.  No chest discomfort or palpitations.  Denies any lightheadedness or dizziness.  No abdominal pain.  Denies dysuria urgency or frequency.  No episiotomy.  Did have a tear that required repair.    Problem List:    Patient Active Problem List    Diagnosis Date Noted     Pregnancy 04/06/2018     Priority: Medium     Encounter for triage in pregnant patient 02/02/2018     Priority: Medium     Irritable bowel syndrome 09/17/2013     Priority: Medium     Constipation, chronic 09/17/2013     Priority: Medium        Past Medical History:    No past medical history on file.    Past Surgical History:    No past surgical history on file.    Family History:    No family history on file.    Social History:  Marital Status:  Single [1]  Social History   Substance Use Topics     Smoking status: Never Smoker     Smokeless tobacco: Never Used     Alcohol use No        Medications:      Acetaminophen (TYLENOL PO)   budesonide (RINOCORT AQUA) 32 MCG/ACT spray   Prenatal  Vit-Fe Fumarate-FA (PRENATAL MULTIVITAMIN PLUS IRON) 27-0.8 MG TABS per tablet         Review of Systems  All pertinent positives and negatives are documented in the HPI.  All others reviewed and are negative .    Physical Exam   BP: 130/75  Pulse: 113  Temp: 99.3  F (37.4  C)  Resp: 18  SpO2: 97 %      Physical Exam  Nursing notes reviewed  Vital signs reviewed.  Constitutional: Appears well-nourished.  Not diaphoretic and not distressed.  HEENT: Normocephalic.  Atraumatic.  Right TM normal.  Left TM normal.  Oral pharynx normal.  Posterior pharynx normal.  Dentition normal.  Eyes: PERRLA.  Conjunctiva clear.  No icteric sclerae.  Extraocular motion normal.  No discharge  Neck: Normal range of motion and supple.  No thyromegaly.  No cervical adenopathy  Cardiovascular: Normal rate and rhythm.  Heart sounds normal.  Intact distal pulses.  No detected murmur.  No friction rub or gallop.  Respiratory: Respiratory effort normal.  Breath sounds clear throughout on auscultation.  No wheezing.  No rales.  Chest/Breast: No deformity.  Chest wall nontender.  Breast engorgement with lactation  Abdomen: Appearance is normal.  Soft.  The uterus palpates enlarged but firm/involuted and nontender bowel sounds present and normal.  No detected abdominal bruit.  No palpable mass.  No hepatomegaly.  Spleen tip not palpable.  No reproducible tenderness.  No guarding.  No rebound.  No palpable hernia.  Genitourinary: Deferred  Musculoskeletal: Normal range of motion both upper and lower extremities with no discomfort.  No edema or tenderness.  Neurologic: Alert.  Oriented ×3.  No cranial nerve deficits.  Normal tone.  No motor or sensory deficits. No pathologic reflexes.   Skin: Warm and dry.  No rash.  Hematologic/lymphatic:  Psychiatric: Normal mood and affect.  Behavior is normal.  Thought content normal.  Judgment normal.    ED Course     ED Course     Procedures          Results for orders placed or performed during the hospital  encounter of 18   CBC with platelets differential   Result Value Ref Range    WBC 13.1 (H) 4.0 - 11.0 10e9/L    RBC Count 3.71 (L) 3.8 - 5.2 10e12/L    Hemoglobin 12.2 11.7 - 15.7 g/dL    Hematocrit 36.0 35.0 - 47.0 %    MCV 97 78 - 100 fl    MCH 32.9 26.5 - 33.0 pg    MCHC 33.9 31.5 - 36.5 g/dL    RDW 13.4 10.0 - 15.0 %    Platelet Count 197 150 - 450 10e9/L    Diff Method Automated Method     % Neutrophils 76.6 %    % Lymphocytes 14.2 %    % Monocytes 7.0 %    % Eosinophils 1.8 %    % Basophils 0.2 %    % Immature Granulocytes 0.2 %    Absolute Neutrophil 10.0 (H) 1.6 - 8.3 10e9/L    Absolute Lymphocytes 1.9 0.8 - 5.3 10e9/L    Absolute Monocytes 0.9 0.0 - 1.3 10e9/L    Absolute Eosinophils 0.2 0.0 - 0.7 10e9/L    Absolute Basophils 0.0 0.0 - 0.2 10e9/L    Abs Immature Granulocytes 0.0 0 - 0.4 10e9/L   Basic metabolic panel   Result Value Ref Range    Sodium 140 133 - 144 mmol/L    Potassium 3.8 3.4 - 5.3 mmol/L    Chloride 109 94 - 109 mmol/L    Carbon Dioxide 24 20 - 32 mmol/L    Anion Gap 7 3 - 14 mmol/L    Glucose 95 70 - 99 mg/dL    Urea Nitrogen 14 7 - 30 mg/dL    Creatinine 0.59 0.52 - 1.04 mg/dL    GFR Estimate >90 >60 mL/min/1.7m2    GFR Estimate If Black >90 >60 mL/min/1.7m2    Calcium 8.7 8.5 - 10.1 mg/dL   TSH   Result Value Ref Range    TSH 1.72 0.40 - 4.00 mU/L         Assessments & Plan (with Medical Decision Making)  22 old female , 4 days postpartum from vaginal delivery, presents with transient facial and upper extremity paresthesias.  No fever.  Breast-feeding is going well with no tenderness around the breast.  No abdominal pain.  Does feel uterine contractions with breast-feeding.  Uterus was involuted with no active bleeding.  Did not complete pelvic examination the patient mentioned that the lochia does not smell foul.  No UTI symptoms.  Normal bowel movements with recent resolution of constipation.  No chest congestion, shortness of breath, pleuritic chest pain, palpitations.   Normal appetite.   Lab work including CBC, BMP, TSH normal  No identified cause for a paresthesias.  Recommend simple observation.  If other neurologic symptoms present then would recommend returning.  Symptoms are bilateral and does not fit with any acute CNS injury.  No acute cardiopulmonary concerns.  Has not been hyperventilating or anxious.     I have reviewed the nursing notes.    I have reviewed the findings, diagnosis, plan and need for follow up with the patient.      New Prescriptions    No medications on file       Final diagnoses:   Postpartum state   Paresthesias       4/22/2018   [facility] EMERGENCY DEPARTMENT     Antione Pickens DO  04/24/18 3485

## 2018-04-23 NOTE — DISCHARGE INSTRUCTIONS
Cardiac, pulmonary, abdomen, neurological examinations were all normal  All blood work normal.  Elevated white blood cell count is normal for the first few weeks postpartum.  White blood cell count starts climbing in the second trimester is a normal response of pregnancy.  No medical complications including no sign for infection at this time.    Monitor for development of any new symptoms.  If noted return to the emergency department or follow-up in the clinic    Thyroid testing results were normal

## 2018-04-25 DIAGNOSIS — R00.2 PALPITATIONS: Primary | ICD-10-CM

## 2018-06-04 ENCOUNTER — APPOINTMENT (OUTPATIENT)
Dept: CT IMAGING | Facility: CLINIC | Age: 23
End: 2018-06-04
Attending: NURSE PRACTITIONER
Payer: COMMERCIAL

## 2018-06-04 ENCOUNTER — HOSPITAL ENCOUNTER (EMERGENCY)
Facility: CLINIC | Age: 23
Discharge: HOME | End: 2018-06-04
Attending: NURSE PRACTITIONER | Admitting: NURSE PRACTITIONER
Payer: COMMERCIAL

## 2018-06-04 VITALS
OXYGEN SATURATION: 97 % | SYSTOLIC BLOOD PRESSURE: 114 MMHG | RESPIRATION RATE: 16 BRPM | DIASTOLIC BLOOD PRESSURE: 63 MMHG | HEART RATE: 66 BPM | TEMPERATURE: 98.5 F

## 2018-06-04 DIAGNOSIS — H81.12 BENIGN PAROXYSMAL POSITIONAL VERTIGO OF LEFT EAR: ICD-10-CM

## 2018-06-04 DIAGNOSIS — R51.9 NONINTRACTABLE EPISODIC HEADACHE, UNSPECIFIED HEADACHE TYPE: ICD-10-CM

## 2018-06-04 LAB
ALBUMIN SERPL-MCNC: 4.5 G/DL (ref 3.4–5)
ALP SERPL-CCNC: 87 U/L (ref 40–150)
ALT SERPL W P-5'-P-CCNC: 47 U/L (ref 0–50)
ANION GAP SERPL CALCULATED.3IONS-SCNC: 10 MMOL/L (ref 3–14)
AST SERPL W P-5'-P-CCNC: 29 U/L (ref 0–45)
BASOPHILS # BLD AUTO: 0.1 10E9/L (ref 0–0.2)
BASOPHILS NFR BLD AUTO: 0.7 %
BILIRUB SERPL-MCNC: 0.8 MG/DL (ref 0.2–1.3)
BUN SERPL-MCNC: 20 MG/DL (ref 7–30)
CALCIUM SERPL-MCNC: 9.3 MG/DL (ref 8.5–10.1)
CHLORIDE SERPL-SCNC: 108 MMOL/L (ref 94–109)
CO2 SERPL-SCNC: 24 MMOL/L (ref 20–32)
CREAT SERPL-MCNC: 0.65 MG/DL (ref 0.52–1.04)
DIFFERENTIAL METHOD BLD: NORMAL
EOSINOPHIL # BLD AUTO: 0.1 10E9/L (ref 0–0.7)
EOSINOPHIL NFR BLD AUTO: 1.3 %
ERYTHROCYTE [DISTWIDTH] IN BLOOD BY AUTOMATED COUNT: 12.1 % (ref 10–15)
GFR SERPL CREATININE-BSD FRML MDRD: >90 ML/MIN/1.7M2
GLUCOSE SERPL-MCNC: 68 MG/DL (ref 70–99)
HCT VFR BLD AUTO: 44.9 % (ref 35–47)
HGB BLD-MCNC: 14.6 G/DL (ref 11.7–15.7)
IMM GRANULOCYTES # BLD: 0 10E9/L (ref 0–0.4)
IMM GRANULOCYTES NFR BLD: 0.1 %
LYMPHOCYTES # BLD AUTO: 2 10E9/L (ref 0.8–5.3)
LYMPHOCYTES NFR BLD AUTO: 29.9 %
MCH RBC QN AUTO: 30.5 PG (ref 26.5–33)
MCHC RBC AUTO-ENTMCNC: 32.5 G/DL (ref 31.5–36.5)
MCV RBC AUTO: 94 FL (ref 78–100)
MONOCYTES # BLD AUTO: 0.4 10E9/L (ref 0–1.3)
MONOCYTES NFR BLD AUTO: 6.3 %
NEUTROPHILS # BLD AUTO: 4.1 10E9/L (ref 1.6–8.3)
NEUTROPHILS NFR BLD AUTO: 61.7 %
NRBC # BLD AUTO: 0 10*3/UL
NRBC BLD AUTO-RTO: 0 /100
PLATELET # BLD AUTO: 175 10E9/L (ref 150–450)
POTASSIUM SERPL-SCNC: 4 MMOL/L (ref 3.4–5.3)
PROT SERPL-MCNC: 8 G/DL (ref 6.8–8.8)
RBC # BLD AUTO: 4.78 10E12/L (ref 3.8–5.2)
SODIUM SERPL-SCNC: 142 MMOL/L (ref 133–144)
WBC # BLD AUTO: 6.7 10E9/L (ref 4–11)

## 2018-06-04 PROCEDURE — 85025 COMPLETE CBC W/AUTO DIFF WBC: CPT | Performed by: NURSE PRACTITIONER

## 2018-06-04 PROCEDURE — 96360 HYDRATION IV INFUSION INIT: CPT

## 2018-06-04 PROCEDURE — 70450 CT HEAD/BRAIN W/O DYE: CPT

## 2018-06-04 PROCEDURE — 80053 COMPREHEN METABOLIC PANEL: CPT | Performed by: NURSE PRACTITIONER

## 2018-06-04 PROCEDURE — 25000128 H RX IP 250 OP 636: Performed by: NURSE PRACTITIONER

## 2018-06-04 PROCEDURE — 99284 EMERGENCY DEPT VISIT MOD MDM: CPT | Mod: Z6 | Performed by: NURSE PRACTITIONER

## 2018-06-04 PROCEDURE — 99284 EMERGENCY DEPT VISIT MOD MDM: CPT | Mod: 25

## 2018-06-04 RX ORDER — CLINDAMYCIN PHOSPHATE 10 MG/G
GEL TOPICAL 2 TIMES DAILY
COMMUNITY
Start: 2018-03-16

## 2018-06-04 RX ADMIN — SODIUM CHLORIDE, POTASSIUM CHLORIDE, SODIUM LACTATE AND CALCIUM CHLORIDE 500 ML: 600; 310; 30; 20 INJECTION, SOLUTION INTRAVENOUS at 14:56

## 2018-06-04 ASSESSMENT — ENCOUNTER SYMPTOMS
CHILLS: 0
DIZZINESS: 1
COUGH: 0
FREQUENCY: 0
MYALGIAS: 0
WEAKNESS: 0
SHORTNESS OF BREATH: 0
HEADACHES: 1
APPETITE CHANGE: 0
FATIGUE: 0
FEVER: 0
ABDOMINAL PAIN: 0
SORE THROAT: 0
BACK PAIN: 0
DYSURIA: 0
NECK PAIN: 0

## 2018-06-04 NOTE — ED AVS SNAPSHOT
[facility] Emergency Department    [address] [address]    Phone:  [phone]    Fax:  [phone]                                       Anna Hosler   MRN: [id number]    Department:  [facility] Emergency Department   Date of Visit:  6/4/2018           After Visit Summary Signature Page     I have received my discharge instructions, and my questions have been answered. I have discussed any challenges I see with this plan with the nurse or doctor.    ..........................................................................................................................................  Patient/Patient Representative Signature      ..........................................................................................................................................  Patient Representative Print Name and Relationship to Patient    ..................................................               ................................................  Date                                            Time    ..........................................................................................................................................  Reviewed by Signature/Title    ...................................................              ..............................................  Date                                                            Time

## 2018-06-04 NOTE — ED NOTES
Presents with c/o HA above eyes that started this AM after she awoke. Pt had some dizziness when she first got up, this went away and now has HA that feels like a migraine. Pt was seen on Friday and had an US to eval for retained contents of conception, but was still bleeding 6 weeks after delivery. Pt had some dizziness along with bleeding that prompted that visit and US. Pt states bleeding since has lessened. No analgesics taken. Pt denies n/v, fevers.

## 2018-06-04 NOTE — ED PROVIDER NOTES
History     Chief Complaint   Patient presents with     Headache     awoke this morning dizzy now has headache     HPI  Anna Hosler is a 22 year old female who presents to the emergency department for evaluation of headache and dizziness.  Symptoms started 3 days ago with a headache on the right side of her head and over her eye.  Patient awoke this morning and felt dizzy, and anytime she turned her head to the left she felt room spinning.  Patient states her headache is worse, dizziness improves if she lays still.  She reports history of frequent headaches, but this does not feel like her typical headaches which usually are in the back of her head.  Denies any nausea or vomiting.  Tolerating fluids.  Denies fever chills.  Patient is breast-feeding.  Patient treated her symptoms yesterday with Tylenol which helped her headache, but today no relief.    Problem List:    Patient Active Problem List    Diagnosis Date Noted     Pregnancy 04/06/2018     Priority: Medium     Encounter for triage in pregnant patient 02/02/2018     Priority: Medium     Irritable bowel syndrome 09/17/2013     Priority: Medium     Constipation, chronic 09/17/2013     Priority: Medium        Past Medical History:    History reviewed. No pertinent past medical history.    Past Surgical History:    History reviewed. No pertinent surgical history.    Family History:    No family history on file.    Social History:  Marital Status:  Single [1]  Social History   Substance Use Topics     Smoking status: Never Smoker     Smokeless tobacco: Never Used     Alcohol use No        Medications:      Acetaminophen (TYLENOL PO)   budesonide (RINOCORT AQUA) 32 MCG/ACT spray   Prenatal Vit-Fe Fumarate-FA (PRENATAL MULTIVITAMIN PLUS IRON) 27-0.8 MG TABS per tablet         Review of Systems   Constitutional: Negative for appetite change, chills, fatigue and fever.   HENT: Positive for congestion (sinus). Negative for ear pain and sore throat.    Respiratory:  Negative for cough and shortness of breath.    Cardiovascular: Negative for chest pain.   Gastrointestinal: Negative for abdominal pain.   Genitourinary: Negative for dysuria, frequency and urgency.   Musculoskeletal: Negative for back pain, myalgias and neck pain.   Skin: Negative for rash.   Neurological: Positive for dizziness (describes vertigo) and headaches. Negative for weakness.       Physical Exam   BP: 103/67  Pulse: 66  Heart Rate: 71  Temp: 98.5  F (36.9  C)  Resp: 16  SpO2: 96 %      Physical Exam   Constitutional: She is oriented to person, place, and time. She appears well-developed and well-nourished. No distress.   HENT:   Head: Normocephalic and atraumatic.   Right Ear: Tympanic membrane, external ear and ear canal normal.   Left Ear: Tympanic membrane, external ear and ear canal normal.   Nose: Nose normal.   Mouth/Throat: Uvula is midline, oropharynx is clear and moist and mucous membranes are normal.   Eyes: Conjunctivae and EOM are normal.   Neck: Normal range of motion. Neck supple.   Cardiovascular: Normal rate, regular rhythm and normal heart sounds.    No murmur heard.  Pulmonary/Chest: Effort normal and breath sounds normal. No respiratory distress. She has no wheezes. She has no rales.   Abdominal: Soft. Bowel sounds are normal. She exhibits no distension. There is no tenderness.   Musculoskeletal: Normal range of motion.   Neurological: She is alert and oriented to person, place, and time. No cranial nerve deficit.   Skin: Skin is warm and dry.       ED Course     ED Course     Procedures             No results found for this or any previous visit (from the past 24 hour(s)).    Medications - No data to display    Assessments & Plan (with Medical Decision Making)   Anna Hosler is a 22 year old female who presents to the emergency department for evaluation of headache and dizziness.  Symptoms started 3 days ago with a headache on the right side of her head and over her eye.  Patient awoke  this morning and felt dizzy, and anytime she turned her head to the left she felt room spinning.  Patient states her headache is worse, dizziness improves if she lays still.  She reports history of frequent headaches, but this does not feel like her typical headaches which usually are in the back of her head.  Denies any nausea or vomiting.  Tolerating fluids.  Denies fever chills.  Patient is breast-feeding.  Patient treated her symptoms yesterday with Tylenol which helped her headache, but today no relief.    On exam patient is alert and oriented.  Patient is pleasant.  Afebrile.  No tachycardia.  Normotensive.  Exam is unremarkable as noted above.  Given patient describes headache that is persistent and accompanied by dizziness.  Headache is not her typical, therefore, head CT was obtained and is normal.  I suspect based on patient's history and exam findings that she is describing vertigo, specifically BPPV since her symptoms of vertigo are triggered by turning her head to the left.  Patient was given IV normal saline 1 L bolus.  Patient was given food to eat.  Patient opted not to receive any medication for her headache given she is breast-feeding.  Patient's symptoms improved while she was here.  Patient instructed to continue to stay well hydrated, rest, and if her vertigo begins again or persists she should follow-up with her primary care provider.  Patient instructed to return to the emergency department for fever, worsening headache, vomiting, or any other new symptoms of concern.  I have reviewed the nursing notes.    I have reviewed the findings, diagnosis, plan and need for follow up with the patient.      New Prescriptions    No medications on file       Final diagnoses:   None       6/4/2018   [facility] EMERGENCY DEPARTMENT     Winnie Caicedo APRN CNP  06/04/18 1929

## 2018-06-04 NOTE — ED AVS SNAPSHOT
[facility]    [address] [facility] [address]    Phone:  [phone]    Fax:  [phone]                                       Anna Hosler   MRN: [id number]    Department:  [facility]   Date of Visit:  6/4/2018           Patient Information     Date Of Birth          1995        Your diagnoses for this visit were:     Nonintractable episodic headache, unspecified headache type     Benign paroxysmal positional vertigo of left ear        You were seen by Winnie Caicedo APRN CNP.        Discharge Instructions       Tylenol for headache.  Stay well hydrated.  Rest.  Return for fever, increased headache, vomiting, persistent vertigo/dizziness or worse in any way.  Follow-up with Dr. Trent if you continue to have intermittent dizziness for more than 3 more days.      Benign Paroxysmal Positional Vertigo    Benign paroxysmal positional vertigo (BPPV) is a problem with the inner ear. The inner ear contains the vestibular system. This system is what helps you keep your balance. BPPV causes a feeling of spinning. It is a common problem of the vestibular system.  Understanding the vestibular system  The vestibular system of the ear is made up of very tiny parts. They include the utricle, saccule, and semicircular canals. The utricle is a tiny organ that contains calcium crystals. In some people, the crystals can move into the semicircular canals. When this happens, the system no longer works as it should. This causes BPPV. Benign means it is not life threatening. Paroxysmal means it happens suddenly. Positional means that it happens when you move your head. Vertigo is a feeling of spinning.  What causes BPPV?  Causes include injury to your head or neck. Other problems with the vestibular system may cause BPPV. In many people, the cause of BPPV is not known.  Symptoms of BPPV  You many have repeated feelings of spinning (vertigo). The vertigo  usually lasts less than 1 minute. Some movements, such as rolling over in bed, can bring on vertigo.  Diagnosing BPPV  Your primary healthcare provider may diagnose and treat your BPPV. Or you may see an ear, nose, and throat doctor (otolaryngologist). In some cases, you may see a nervous system doctor (neurologist).  The healthcare provider will ask about your symptoms and your medical history. He or she will examine you. You may have hearing and balance tests. As part of the exam, your healthcare provider may have you move your head and body in certain ways. If you have BPPV, the movements can bring on vertigo. Your provider will also look for abnormal movements of your eyes. You may have other tests to check your vestibular or nervous systems.  Treatment for BPPV  Your healthcare provider may try to move the calcium crystals. This is done by having you move your head and neck in certain ways. This treatment is safe and often works well. You may also be told to do these movements at home. You may still have vertigo for a few weeks. Your healthcare provider will recheck your symptoms, usually in about a month. Special physical therapy may also be part of treatment. In rare cases, surgery may be needed for BPPV that does not go away.     When to call the healthcare provider  Call your healthcare provider right away if you have any of these:    Symptoms that do not go away with treatment    Symptoms that get worse    New symptoms   Date Last Reviewed: 5/1/2017 2000-2017 [employer]. [address] [place] [address]. All rights reserved. This information is not intended as a substitute for professional medical care. Always follow your healthcare professional's instructions.          24 Hour Appointment Hotline       To make an appointment at any [facility], call [phone] ([phone]). If you don't have a family doctor or clinic, we will help you find one. [facility] are  conveniently located to serve the needs of you and your family.             Review of your medicines      Our records show that you are taking the medicines listed below. If these are incorrect, please call your family doctor or clinic.        Dose / Directions Last dose taken    budesonide 32 MCG/ACT spray   Commonly known as:  RINOCORT AQUA   Dose:  1 spray        Spray 1 spray into both nostrils daily   Refills:  0        clindamycin 1 % topical gel   Commonly known as:  CLINDAMAX        Apply topically 2 times daily To face   Refills:  0        prenatal multivitamin plus iron 27-0.8 MG Tabs per tablet   Dose:  1 tablet        Take 1 tablet by mouth daily   Refills:  0        TYLENOL PO   Dose:  500 mg        Take 500 mg by mouth every 6 hours as needed for mild pain or fever   Refills:  0                Procedures and tests performed during your visit     CBC with platelets differential    CT Head w/o Contrast    Comprehensive metabolic panel      Orders Needing Specimen Collection     None      Pending Results     No orders found from 6/2/2018 to 6/5/2018.            Pending Culture Results     No orders found from 6/2/2018 to 6/5/2018.            Pending Results Instructions     If you had any lab results that were not finalized at the time of your Discharge, you can call the ED Lab Result RN at [phone]. You will be contacted by this team for any positive Lab results or changes in treatment. The nurses are available 7 days a week from 10A to 6:30P.  You can leave a message 24 hours per day and they will return your call.        Test Results From Your Hospital Stay        6/4/2018  3:00 PM      Component Results     Component Value Ref Range & Units Status    WBC 6.7 4.0 - 11.0 10e9/L Final    RBC Count 4.78 3.8 - 5.2 10e12/L Final    Hemoglobin 14.6 11.7 - 15.7 g/dL Final    Hematocrit 44.9 35.0 - 47.0 % Final    MCV 94 78 - 100 fl Final    MCH 30.5 26.5 - 33.0 pg Final    MCHC 32.5 31.5 - 36.5 g/dL Final     RDW 12.1 10.0 - 15.0 % Final    Platelet Count 175 150 - 450 10e9/L Final    Diff Method Automated Method  Final    % Neutrophils 61.7 % Final    % Lymphocytes 29.9 % Final    % Monocytes 6.3 % Final    % Eosinophils 1.3 % Final    % Basophils 0.7 % Final    % Immature Granulocytes 0.1 % Final    Nucleated RBCs 0 0 /100 Final    Absolute Neutrophil 4.1 1.6 - 8.3 10e9/L Final    Absolute Lymphocytes 2.0 0.8 - 5.3 10e9/L Final    Absolute Monocytes 0.4 0.0 - 1.3 10e9/L Final    Absolute Eosinophils 0.1 0.0 - 0.7 10e9/L Final    Absolute Basophils 0.1 0.0 - 0.2 10e9/L Final    Abs Immature Granulocytes 0.0 0 - 0.4 10e9/L Final    Absolute Nucleated RBC 0.0  Final         6/4/2018  3:16 PM      Component Results     Component Value Ref Range & Units Status    Sodium 142 133 - 144 mmol/L Final    Potassium 4.0 3.4 - 5.3 mmol/L Final    Chloride 108 94 - 109 mmol/L Final    Carbon Dioxide 24 20 - 32 mmol/L Final    Anion Gap 10 3 - 14 mmol/L Final    Glucose 68 (L) 70 - 99 mg/dL Final    Urea Nitrogen 20 7 - 30 mg/dL Final    Creatinine 0.65 0.52 - 1.04 mg/dL Final    GFR Estimate >90 >60 mL/min/1.7m2 Final    Non  GFR Calc    GFR Estimate If Black >90 >60 mL/min/1.7m2 Final    African American GFR Calc    Calcium 9.3 8.5 - 10.1 mg/dL Final    Bilirubin Total 0.8 0.2 - 1.3 mg/dL Final    Albumin 4.5 3.4 - 5.0 g/dL Final    Protein Total 8.0 6.8 - 8.8 g/dL Final    Alkaline Phosphatase 87 40 - 150 U/L Final    ALT 47 0 - 50 U/L Final    AST 29 0 - 45 U/L Final         6/4/2018  3:28 PM      Narrative     CT HEAD WITHOUT CONTRAST June 4, 2018 3:12 PM    HISTORY: Headache and dizziness.     TECHNIQUE: Scans were obtained through the head without IV contrast.  Radiation dose for this scan was reduced using automated exposure  control, adjustment of the mA and/or kV according to patient size, or  iterative reconstruction technique.    COMPARISON: None.    FINDINGS: No hemorrhage, mass lesion, or focal area  "of acute  infarction identified. Paranasal sinuses are normal. No bony  abnormality.         Impression     IMPRESSION: Negative CT scan of the head.    DENISE CALLEJAS MD                Thank you for choosing [facility]       Thank you for choosing [facility] for your care. Our goal is always to provide you with excellent care. Hearing back from our patients is one way we can continue to improve our services. Please take a few minutes to complete the written survey that you may receive in the mail after you visit with us. Thank you!        [employer] Information     [employer] lets you send messages to your doctor, view your test results, renew your prescriptions, schedule appointments and more. To sign up, go to [email or web address] . Click on \"Log in\" on the left side of the screen, which will take you to the Welcome page. Then click on \"Sign up Now\" on the right side of the page.     You will be asked to enter the access code listed below, as well as some personal information. Please follow the directions to create your username and password.     Your access code is: [email or web address]  Expires: 2018  3:48 PM     Your access code will  in 90 days. If you need help or a new code, please call your [facility] clinic or [phone].        Care EveryWhere ID     This is your Care EveryWhere ID. This could be used by other organizations to access your [facility] medical records  [id number]        Equal Access to Services     SINDHU BROCK : Hadii roxann blue Soluzma, waaxda luqadaha, qaybta kaalmada kan, lane adams . So [facility] [phone].    ATENCIÓN: Si habla español, tiene a ludwig disposición servicios gratuitos de asistencia lingüística. Hannaame al [phone].    We comply with applicable federal civil rights laws and [place] laws. We do not discriminate on the basis of race, color, national origin, age, disability, sex, sexual orientation, or gender identity.            After " Visit Summary       This is your record. Keep this with you and show to your community pharmacist(s) and doctor(s) at your next visit.

## 2018-06-04 NOTE — DISCHARGE INSTRUCTIONS
Tylenol for headache.  Stay well hydrated.  Rest.  Return for fever, increased headache, vomiting, persistent vertigo/dizziness or worse in any way.  Follow-up with Dr. Trent if you continue to have intermittent dizziness for more than 3 more days.      Benign Paroxysmal Positional Vertigo    Benign paroxysmal positional vertigo (BPPV) is a problem with the inner ear. The inner ear contains the vestibular system. This system is what helps you keep your balance. BPPV causes a feeling of spinning. It is a common problem of the vestibular system.  Understanding the vestibular system  The vestibular system of the ear is made up of very tiny parts. They include the utricle, saccule, and semicircular canals. The utricle is a tiny organ that contains calcium crystals. In some people, the crystals can move into the semicircular canals. When this happens, the system no longer works as it should. This causes BPPV. Benign means it is not life threatening. Paroxysmal means it happens suddenly. Positional means that it happens when you move your head. Vertigo is a feeling of spinning.  What causes BPPV?  Causes include injury to your head or neck. Other problems with the vestibular system may cause BPPV. In many people, the cause of BPPV is not known.  Symptoms of BPPV  You many have repeated feelings of spinning (vertigo). The vertigo usually lasts less than 1 minute. Some movements, such as rolling over in bed, can bring on vertigo.  Diagnosing BPPV  Your primary healthcare provider may diagnose and treat your BPPV. Or you may see an ear, nose, and throat doctor (otolaryngologist). In some cases, you may see a nervous system doctor (neurologist).  The healthcare provider will ask about your symptoms and your medical history. He or she will examine you. You may have hearing and balance tests. As part of the exam, your healthcare provider may have you move your head and body in certain ways. If you have BPPV, the movements can  bring on vertigo. Your provider will also look for abnormal movements of your eyes. You may have other tests to check your vestibular or nervous systems.  Treatment for BPPV  Your healthcare provider may try to move the calcium crystals. This is done by having you move your head and neck in certain ways. This treatment is safe and often works well. You may also be told to do these movements at home. You may still have vertigo for a few weeks. Your healthcare provider will recheck your symptoms, usually in about a month. Special physical therapy may also be part of treatment. In rare cases, surgery may be needed for BPPV that does not go away.     When to call the healthcare provider  Call your healthcare provider right away if you have any of these:    Symptoms that do not go away with treatment    Symptoms that get worse    New symptoms   Date Last Reviewed: 5/1/2017 2000-2017 [employer]. [address] [place] [address]. All rights reserved. This information is not intended as a substitute for professional medical care. Always follow your healthcare professional's instructions.

## 2018-08-12 ENCOUNTER — HOSPITAL ENCOUNTER (EMERGENCY)
Facility: CLINIC | Age: 23
Discharge: HOME | End: 2018-08-12
Attending: FAMILY MEDICINE | Admitting: FAMILY MEDICINE
Payer: COMMERCIAL

## 2018-08-12 VITALS
RESPIRATION RATE: 16 BRPM | SYSTOLIC BLOOD PRESSURE: 112 MMHG | OXYGEN SATURATION: 97 % | DIASTOLIC BLOOD PRESSURE: 68 MMHG | HEART RATE: 115 BPM | TEMPERATURE: 100.3 F | HEIGHT: 66 IN

## 2018-08-12 DIAGNOSIS — N97.0 ANOVULATORY BLEEDING: ICD-10-CM

## 2018-08-12 DIAGNOSIS — J06.9 UPPER RESPIRATORY TRACT INFECTION, UNSPECIFIED TYPE: ICD-10-CM

## 2018-08-12 PROCEDURE — 99283 EMERGENCY DEPT VISIT LOW MDM: CPT

## 2018-08-12 PROCEDURE — 99283 EMERGENCY DEPT VISIT LOW MDM: CPT | Mod: Z6 | Performed by: FAMILY MEDICINE

## 2018-08-12 NOTE — ED AVS SNAPSHOT
[facility]    [address] [facility] [address]    Phone:  [phone]    Fax:  [phone]                                       Anna Hosler   MRN: [id number]    Department:  [facility]   Date of Visit:  8/12/2018           After Visit Summary Signature Page     I have received my discharge instructions, and my questions have been answered. I have discussed any challenges I see with this plan with the nurse or doctor.    ..........................................................................................................................................  Patient/Patient Representative Signature      ..........................................................................................................................................  Patient Representative Print Name and Relationship to Patient    ..................................................               ................................................  Date                                            Time    ..........................................................................................................................................  Reviewed by Signature/Title    ...................................................              ..............................................  Date                                                            Time

## 2018-08-12 NOTE — ED AVS SNAPSHOT
[facility]    [address] [facility] [address]    Phone:  [phone]    Fax:  [phone]                                       Anna Hosler   MRN: [id number]    Department:  [facility]   Date of Visit:  8/12/2018           Patient Information     Date Of Birth          1995        Your diagnoses for this visit were:     Anovulatory bleeding I suspect this is due to breast feeding and suppressing ovulation and therefore without progesterone.  This is why the provera given in late June helped the bleeding.  Discuss further with Dr. Priest and consider scheduled provera or OCP (although this could decrease milk let-down).  consider Mirena IUD    Upper respiratory tract infection, unspecified type use nasal saline, guaifenesin (mucinex) as needed. tylenol or motrin       You were seen by Andre Garcia MD.      Follow-up Information     Follow up with Clinic, [facility] In 1 week.    Contact information:    [address] [place] [address] [phone]          Follow up with [facility].    Specialty:  EMERGENCY MEDICINE    Why:  As needed, If symptoms worsen    Contact information:    [address] [place] [address] [phone]    Additional information:    The medical center is located at   [address] [place] [address]. (between [address] and   [address] in [place], [place] miles north   of [place]).        Discharge Instructions         ICD-10-CM    1. Anovulatory bleeding N97.0     I suspect this is due to breast feeding and suppressing ovulation and therefore without progesterone.  This is why the provera given in late June helped the bleeding.  Discuss further with Dr. Priest and consider scheduled provera or OCP (although this could decrease milk let-down).  consider Mirena IUD   2. Upper respiratory tract infection, unspecified type J06.9     use nasal saline, guaifenesin (mucinex) as needed.  tylenol or motrin     Treating Upper Respiratory Infections and Sinus Infections    START WITH OVER-THE-COUNTER TOPICAL MEDICATIONS  Saline Nasal Spray (Deep Sea or Ocean)   Effective decongestant without any systemic side effects   Use this as frequently as every 10-15 minutes   Start use in the morning with 3 minutes of constant infusion    Oxymetazoline (Afrin)   Used twice daily for moderate to severe relief of sinus headache   Do not exceed 3 days of use       Prolonged use results in a chronic runny nose    CONSIDER ADDING OVER-THE-COUNTER ORAL MEDICATIONS  Guaifenesin (Robitussin, over-the-counter)   Guaifenesin is an expectorant that helps promote sinus drainage   Guaifenesin may cause a dry mouth sensation    Stay hydrated and consider sucking candies   Guaifenesin will increase coughing temporarily    Increases sinus drainage and is also a cough expectorant   Adult dosing is 400 mg of the short-acting Guaifenesin every 4 hours   Mucinex (12 hour preparation of high dose Guaifenesin alone)    Ibuprofen (Advil, Motrin)   Helps relieve the sinus headache, lower fever, relieve muscle aches   Adults may use 600 mg orally every 6 hours as needed with food   Children should not exceed 10 mg per kilogram every 6 hours    ANTIBIOTICS   Upper respiratory infections nearly always start as viral infections   Bacteria do not super-infect sinuses until after 10-14 days   Antibiotics are only indicated if symptoms last more than 10-14 days    THE SEASONAL ALLERGY TO SINUS INFECTION CONNECTION  Frequent sinus infections are often due to underlying seasonal allergies  Allergy control may prevent sinus infections      Consider starting a nasal steroid inhaler (prescription only)  Antihistamines may dry-up sinus drainage and worsen sinusitis       Avoid antihistamines unless sinusitus is unlikely          Discharge References/Attachments     DYSFUNCTIONAL UTERINE BLEEDING (ENGLISH)      24 Hour Appointment Hotline       To  make an appointment at any [facility] clinic, call [phone]). If you don't have a family doctor or clinic, we will help you find one. [facility] clinics are conveniently located to serve the needs of you and your family.             Review of your medicines      Our records show that you are taking the medicines listed below. If these are incorrect, please call your family doctor or clinic.        Dose / Directions Last dose taken    budesonide 32 MCG/ACT spray   Commonly known as:  RINOCORT AQUA   Dose:  1 spray        Spray 1 spray into both nostrils daily   Refills:  0        clindamycin 1 % topical gel   Commonly known as:  CLINDAMAX        Apply topically 2 times daily To face   Refills:  0        prenatal multivitamin plus iron 27-0.8 MG Tabs per tablet   Dose:  1 tablet        Take 1 tablet by mouth daily   Refills:  0        TYLENOL PO   Dose:  500 mg        Take 500 mg by mouth every 6 hours as needed for mild pain or fever   Refills:  0                Orders Needing Specimen Collection     None      Pending Results     No orders found from 8/10/2018 to 8/13/2018.            Pending Culture Results     No orders found from 8/10/2018 to 8/13/2018.            Pending Results Instructions     If you had any lab results that were not finalized at the time of your Discharge, you can call the ED Lab Result RN at [phone]. You will be contacted by this team for any positive Lab results or changes in treatment. The nurses are available 7 days a week from 10A to 6:30P.  You can leave a message 24 hours per day and they will return your call.        Test Results From Your Hospital Stay               Thank you for choosing [facility]       Thank you for choosing [facility] for your care. Our goal is always to provide you with excellent care. Hearing back from our patients is one way we can continue to improve our services. Please take a few minutes to complete the written survey that you may receive  "in the mail after you visit with us. Thank you!        [employer] Information     [employer] lets you send messages to your doctor, view your test results, renew your prescriptions, schedule appointments and more. To sign up, go to [email or web address] . Click on \"Log in\" on the left side of the screen, which will take you to the Welcome page. Then click on \"Sign up Now\" on the right side of the page.     You will be asked to enter the access code listed below, as well as some personal information. Please follow the directions to create your username and password.     Your access code is: [email or web address]  Expires: 2018  3:48 PM     Your access code will  in 90 days. If you need help or a new code, please call your [facility] clinic or [phone].        Care EveryWhere ID     This is your Care EveryWhere ID. This could be used by other organizations to access your [facility] medical records  [id number]        Equal Access to Services     SINDHU BROCK : Hadii roxann campoo Soluzma, waaxda luqadaha, qaybta kaalmada adehugo, lane adams . So [facility] [phone].    ATENCIÓN: Si habla español, tiene a ludwig disposición servicios gratuitos de asistencia lingüística. Llame al [phone].    We comply with applicable federal civil rights laws and [place] laws. We do not discriminate on the basis of race, color, national origin, age, disability, sex, sexual orientation, or gender identity.            After Visit Summary       This is your record. Keep this with you and show to your community pharmacist(s) and doctor(s) at your next visit.                  "

## 2018-08-13 NOTE — ED PROVIDER NOTES
History     Chief Complaint   Patient presents with     Otalgia     had a baby 4 months ago. was bleeding and saw here pcp and was given a hormone. the bleeding stopped but now it has started up again     Fever     Vaginal Bleeding     HPI  Anna Hosler is a 22 year old female who presents with presents with a history of delivery 4 months ago and since then with anovulatory bleeding that had brief respite after she was given Provera 10 mg for 10 days.  She otherwise has been having persistent likely anovulatory bleeding related to her breast-feeding since then.  We discussed that this was not an acute issue and Dr. Trent is already aware and is been managing her to follow-up for this.  We discussed her presenting complaints today of upper respiratory infection with ear pain sinus pressure sore throat no postnasal drainage mild cough.  Daughter with similar upper respiratory symptoms.  Tolerating food fluids.  Low-grade fever.  Also has periodic lateral abdominal pain in the region of the round ligaments when she coughs.  She otherwise has no abdominal pain.  No vaginal discharge.  She has no dysuria urgency frequency or hematuria.    Problem List:    Patient Active Problem List    Diagnosis Date Noted     Pregnancy 04/06/2018     Priority: Medium     Encounter for triage in pregnant patient 02/02/2018     Priority: Medium     Irritable bowel syndrome 09/17/2013     Priority: Medium     Constipation, chronic 09/17/2013     Priority: Medium        Past Medical History:    No past medical history on file.    Past Surgical History:    No past surgical history on file.    Family History:    No family history on file.    Social History:  Marital Status:  Single [1]  Social History   Substance Use Topics     Smoking status: Never Smoker     Smokeless tobacco: Never Used     Alcohol use No        Medications:      Acetaminophen (TYLENOL PO)   budesonide (RINOCORT AQUA) 32 MCG/ACT spray   clindamycin (CLINDAMAX) 1 %  "topical gel   Prenatal Vit-Fe Fumarate-FA (PRENATAL MULTIVITAMIN PLUS IRON) 27-0.8 MG TABS per tablet         Review of Systems   ROS:  5 point ROS negative except as noted above in HPI, including Gen., Resp., CV, GI &  system review.      Physical Exam   BP: 112/68  Pulse: 115  Temp: 100.3  F (37.9  C)  Resp: 16  Height: 167.6 cm (5' 6\")  SpO2: 97 %      Physical Exam   Constitutional: No distress.   HENT:   Left Ear: External ear normal.   Mouth/Throat: Oropharynx is clear and moist. No oropharyngeal exudate.   Eyes: Conjunctivae are normal.   Neck: Neck supple.   Cardiovascular: Regular rhythm.  Tachycardia present.  Exam reveals no gallop and no friction rub.    No murmur heard.  Pulmonary/Chest: Effort normal and breath sounds normal. No respiratory distress. She has no wheezes. She has no rales.   Abdominal: Soft. She exhibits no distension. There is no tenderness. There is no rebound.   Musculoskeletal: She exhibits no edema.   Neurological: She is alert. She exhibits normal muscle tone.   Skin: No rash noted. She is not diaphoretic.          ED Course     ED Course     Procedures               Critical Care time:  none               No results found for this or any previous visit (from the past 24 hour(s)).    Medications - No data to display    Assessments & Plan (with Medical Decision Making)     MDM: Anna Hosler is a 22 year old female who presented with upper respiratory symptoms that had onset in the last couple of days.  She has no significant findings on exam.  We did also discuss her anovulatory bleeding at length and as noted above I asked her to follow-up with her primary provider Dr. deshpande to discuss this further.  Certainly returning for any abdominal pain no significant or fever foul vaginal discharge.    I have reviewed the nursing notes.    I have reviewed the findings, diagnosis, plan and need for follow up with the patient.       New Prescriptions    No medications on file       Final " diagnoses:   Anovulatory bleeding - I suspect this is due to breast feeding and suppressing ovulation and therefore without progesterone.  This is why the provera given in late June helped the bleeding.  Discuss further with Dr. Priest and consider scheduled provera or OCP (although this could decrease milk let-down).  consider Mirena IUD   Upper respiratory tract infection, unspecified type - use nasal saline, guaifenesin (mucinex) as needed. tylenol or motrin       8/12/2018   [facility] EMERGENCY DEPARTMENT     Andre Garcia MD  08/12/18 7027

## 2018-08-13 NOTE — ED NOTES
Pt states that she took her temperature today because she had a temp She states she had a temp of 102.. She has had ear aches and a headache since yesterday. She has also had vaginal bleeding which she is being TX for . She was given  Medication and the bleeding stopped. She is 4 months post delivery. She states she wonders if she may have an infection going on.. The drainage is brown.

## 2018-08-13 NOTE — DISCHARGE INSTRUCTIONS
ICD-10-CM    1. Anovulatory bleeding N97.0     I suspect this is due to breast feeding and suppressing ovulation and therefore without progesterone.  This is why the provera given in late June helped the bleeding.  Discuss further with Dr. Priest and consider scheduled provera or OCP (although this could decrease milk let-down).  consider Mirena IUD   2. Upper respiratory tract infection, unspecified type J06.9     use nasal saline, guaifenesin (mucinex) as needed. tylenol or motrin     Treating Upper Respiratory Infections and Sinus Infections    START WITH OVER-THE-COUNTER TOPICAL MEDICATIONS  Saline Nasal Spray (Deep Sea or Ocean)   Effective decongestant without any systemic side effects   Use this as frequently as every 10-15 minutes   Start use in the morning with 3 minutes of constant infusion    Oxymetazoline (Afrin)   Used twice daily for moderate to severe relief of sinus headache   Do not exceed 3 days of use       Prolonged use results in a chronic runny nose    CONSIDER ADDING OVER-THE-COUNTER ORAL MEDICATIONS  Guaifenesin (Robitussin, over-the-counter)   Guaifenesin is an expectorant that helps promote sinus drainage   Guaifenesin may cause a dry mouth sensation    Stay hydrated and consider sucking candies   Guaifenesin will increase coughing temporarily    Increases sinus drainage and is also a cough expectorant   Adult dosing is 400 mg of the short-acting Guaifenesin every 4 hours   Mucinex (12 hour preparation of high dose Guaifenesin alone)    Ibuprofen (Advil, Motrin)   Helps relieve the sinus headache, lower fever, relieve muscle aches   Adults may use 600 mg orally every 6 hours as needed with food   Children should not exceed 10 mg per kilogram every 6 hours    ANTIBIOTICS   Upper respiratory infections nearly always start as viral infections   Bacteria do not super-infect sinuses until after 10-14 days   Antibiotics are only indicated if symptoms last more than 10-14 days    THE SEASONAL  ALLERGY TO SINUS INFECTION CONNECTION  Frequent sinus infections are often due to underlying seasonal allergies  Allergy control may prevent sinus infections      Consider starting a nasal steroid inhaler (prescription only)  Antihistamines may dry-up sinus drainage and worsen sinusitis       Avoid antihistamines unless sinusitus is unlikely

## 2018-08-14 ENCOUNTER — HOSPITAL ENCOUNTER (EMERGENCY)
Facility: CLINIC | Age: 23
Discharge: HOME | End: 2018-08-14
Attending: EMERGENCY MEDICINE | Admitting: EMERGENCY MEDICINE
Payer: COMMERCIAL

## 2018-08-14 ENCOUNTER — APPOINTMENT (OUTPATIENT)
Dept: GENERAL RADIOLOGY | Facility: CLINIC | Age: 23
End: 2018-08-14
Attending: EMERGENCY MEDICINE
Payer: COMMERCIAL

## 2018-08-14 VITALS
TEMPERATURE: 98.3 F | RESPIRATION RATE: 16 BRPM | HEART RATE: 96 BPM | DIASTOLIC BLOOD PRESSURE: 60 MMHG | SYSTOLIC BLOOD PRESSURE: 106 MMHG | OXYGEN SATURATION: 94 %

## 2018-08-14 DIAGNOSIS — J18.9 COMMUNITY ACQUIRED PNEUMONIA OF RIGHT UPPER LOBE OF LUNG: ICD-10-CM

## 2018-08-14 LAB
ALBUMIN SERPL-MCNC: 4.1 G/DL (ref 3.4–5)
ALP SERPL-CCNC: 75 U/L (ref 40–150)
ALT SERPL W P-5'-P-CCNC: 31 U/L (ref 0–50)
ANION GAP SERPL CALCULATED.3IONS-SCNC: 7 MMOL/L (ref 3–14)
AST SERPL W P-5'-P-CCNC: 23 U/L (ref 0–45)
BASOPHILS # BLD AUTO: 0 10E9/L (ref 0–0.2)
BASOPHILS NFR BLD AUTO: 0.6 %
BILIRUB SERPL-MCNC: 0.7 MG/DL (ref 0.2–1.3)
BUN SERPL-MCNC: 11 MG/DL (ref 7–30)
CALCIUM SERPL-MCNC: 9 MG/DL (ref 8.5–10.1)
CHLORIDE SERPL-SCNC: 103 MMOL/L (ref 94–109)
CO2 SERPL-SCNC: 26 MMOL/L (ref 20–32)
CREAT SERPL-MCNC: 0.82 MG/DL (ref 0.52–1.04)
DIFFERENTIAL METHOD BLD: ABNORMAL
EOSINOPHIL # BLD AUTO: 0 10E9/L (ref 0–0.7)
EOSINOPHIL NFR BLD AUTO: 0.2 %
ERYTHROCYTE [DISTWIDTH] IN BLOOD BY AUTOMATED COUNT: 13.3 % (ref 10–15)
GFR SERPL CREATININE-BSD FRML MDRD: 89 ML/MIN/1.7M2
GLUCOSE SERPL-MCNC: 92 MG/DL (ref 70–99)
HCG UR QL: NEGATIVE
HCT VFR BLD AUTO: 41.6 % (ref 35–47)
HGB BLD-MCNC: 14 G/DL (ref 11.7–15.7)
IMM GRANULOCYTES # BLD: 0 10E9/L (ref 0–0.4)
IMM GRANULOCYTES NFR BLD: 0.6 %
LACTATE BLD-SCNC: 1.4 MMOL/L (ref 0.7–2)
LYMPHOCYTES # BLD AUTO: 0.9 10E9/L (ref 0.8–5.3)
LYMPHOCYTES NFR BLD AUTO: 18.1 %
MCH RBC QN AUTO: 31 PG (ref 26.5–33)
MCHC RBC AUTO-ENTMCNC: 33.7 G/DL (ref 31.5–36.5)
MCV RBC AUTO: 92 FL (ref 78–100)
MONOCYTES # BLD AUTO: 0.7 10E9/L (ref 0–1.3)
MONOCYTES NFR BLD AUTO: 13.9 %
NEUTROPHILS # BLD AUTO: 3.1 10E9/L (ref 1.6–8.3)
NEUTROPHILS NFR BLD AUTO: 66.6 %
NRBC # BLD AUTO: 0 10*3/UL
NRBC BLD AUTO-RTO: 0 /100
PLATELET # BLD AUTO: 102 10E9/L (ref 150–450)
POTASSIUM SERPL-SCNC: 4 MMOL/L (ref 3.4–5.3)
PROT SERPL-MCNC: 7.8 G/DL (ref 6.8–8.8)
RBC # BLD AUTO: 4.52 10E12/L (ref 3.8–5.2)
SODIUM SERPL-SCNC: 136 MMOL/L (ref 133–144)
WBC # BLD AUTO: 4.7 10E9/L (ref 4–11)

## 2018-08-14 PROCEDURE — 96361 HYDRATE IV INFUSION ADD-ON: CPT | Performed by: EMERGENCY MEDICINE

## 2018-08-14 PROCEDURE — 83605 ASSAY OF LACTIC ACID: CPT | Performed by: EMERGENCY MEDICINE

## 2018-08-14 PROCEDURE — 99284 EMERGENCY DEPT VISIT MOD MDM: CPT | Mod: Z6 | Performed by: EMERGENCY MEDICINE

## 2018-08-14 PROCEDURE — 71046 X-RAY EXAM CHEST 2 VIEWS: CPT

## 2018-08-14 PROCEDURE — 99284 EMERGENCY DEPT VISIT MOD MDM: CPT | Mod: 25 | Performed by: EMERGENCY MEDICINE

## 2018-08-14 PROCEDURE — 85025 COMPLETE CBC W/AUTO DIFF WBC: CPT | Performed by: EMERGENCY MEDICINE

## 2018-08-14 PROCEDURE — 96360 HYDRATION IV INFUSION INIT: CPT | Performed by: EMERGENCY MEDICINE

## 2018-08-14 PROCEDURE — 80053 COMPREHEN METABOLIC PANEL: CPT | Performed by: EMERGENCY MEDICINE

## 2018-08-14 PROCEDURE — 81025 URINE PREGNANCY TEST: CPT | Performed by: EMERGENCY MEDICINE

## 2018-08-14 PROCEDURE — 25000132 ZZH RX MED GY IP 250 OP 250 PS 637: Performed by: EMERGENCY MEDICINE

## 2018-08-14 PROCEDURE — 25000128 H RX IP 250 OP 636: Performed by: EMERGENCY MEDICINE

## 2018-08-14 RX ORDER — IBUPROFEN 400 MG/1
800 TABLET, FILM COATED ORAL ONCE
Status: COMPLETED | OUTPATIENT
Start: 2018-08-14 | End: 2018-08-14

## 2018-08-14 RX ORDER — FLUTICASONE PROPIONATE 50 MCG
2 SPRAY, SUSPENSION (ML) NASAL DAILY
COMMUNITY
Start: 2018-07-11

## 2018-08-14 RX ORDER — LIDOCAINE 40 MG/G
CREAM TOPICAL
Status: DISCONTINUED | OUTPATIENT
Start: 2018-08-14 | End: 2018-08-14 | Stop reason: HOSPADM

## 2018-08-14 RX ORDER — AZITHROMYCIN 250 MG/1
TABLET, FILM COATED ORAL
Qty: 6 TABLET | Refills: 0 | Status: SHIPPED | OUTPATIENT
Start: 2018-08-14

## 2018-08-14 RX ADMIN — SODIUM CHLORIDE 1000 ML: 9 INJECTION, SOLUTION INTRAVENOUS at 14:06

## 2018-08-14 RX ADMIN — IBUPROFEN 800 MG: 400 TABLET ORAL at 14:04

## 2018-08-14 NOTE — DISCHARGE INSTRUCTIONS
Drink plenty of fluids.  Use acetaminophen 1 g and ibuprofen 600 mg every 6 hours as needed for symptoms.  Take the antibiotics as prescribed over the next 5 days.  Return to the emergency department if you have worsening symptoms, lightheadedness, difficulty breathing, or other concerns.  Follow-up in clinic if not feeling better in 3-4 days.

## 2018-08-14 NOTE — LETTER
August 14, 2018      To Whom It May Concern:      Anna Hosler was seen in our Emergency Department today, 08/14/18.  I expect her condition to improve over the next 2-3 days.  She may return to work/school when improved.    Sincerely,        Preet Walker MD

## 2018-08-14 NOTE — ED AVS SNAPSHOT
[facility] Emergency Department    [address] [address]    Phone:  [phone]    Fax:  [phone]                                       Anna Hosler   MRN: [id number]    Department:  [facility] Emergency Department   Date of Visit:  8/14/2018           Patient Information     Date Of Birth          1995        Your diagnoses for this visit were:     Community acquired pneumonia of right upper lobe of lung (H)        You were seen by Preet Walker MD.        Discharge Instructions       Drink plenty of fluids.  Use acetaminophen 1 g and ibuprofen 600 mg every 6 hours as needed for symptoms.  Take the antibiotics as prescribed over the next 5 days.  Return to the emergency department if you have worsening symptoms, lightheadedness, difficulty breathing, or other concerns.  Follow-up in clinic if not feeling better in 3-4 days.    24 Hour Appointment Hotline       To make an appointment at any [facility], call [phone] ([phone]). If you don't have a family doctor or clinic, we will help you find one. [place] clinics are conveniently located to serve the needs of you and your family.             Review of your medicines      START taking        Dose / Directions Last dose taken    azithromycin 250 MG tablet   Commonly known as:  ZITHROMAX   Quantity:  6 tablet        Two tablets first day, then one tablet daily for four days.   Refills:  0          Our records show that you are taking the medicines listed below. If these are incorrect, please call your family doctor or clinic.        Dose / Directions Last dose taken    clindamycin 1 % topical gel   Commonly known as:  CLINDAMAX        Apply topically 2 times daily To face   Refills:  0        fluticasone 50 MCG/ACT spray   Commonly known as:  FLONASE   Dose:  2 spray        Spray 2 sprays in nostril daily   Refills:  0        prenatal multivitamin plus iron 27-0.8 MG Tabs per tablet   Dose:  1 tablet         Take 1 tablet by mouth daily   Refills:  0        TYLENOL PO   Dose:  500 mg        Take 500 mg by mouth every 6 hours as needed for mild pain or fever   Refills:  0                Prescriptions were sent or printed at these locations (1 Prescription)                   [email or web address] - [place], [place] - [address] AT [facility] OF [address]   [address], [facility] [address]    Telephone:  [phone]   Fax:  [phone]   Hours:                  E-Prescribed (1 of 1)         azithromycin (ZITHROMAX) 250 MG tablet                Procedures and tests performed during your visit     Procedure/Test Number of Times Performed    CBC with platelets differential 1    Cardiac Continuous Monitoring 1    Comprehensive metabolic panel 1    Draw and hold 3    HCG qualitative urine 1    Lactate for Sepsis Protocol 1    Peripheral IV: Standard 1    Pulse oximetry nursing 1    XR Chest 2 Views 1      Orders Needing Specimen Collection     None      Pending Results     Date and Time Order Name Status Description    8/14/2018 1336 XR Chest 2 Views Preliminary             Pending Culture Results     No orders found from 8/12/2018 to 8/15/2018.            Pending Results Instructions     If you had any lab results that were not finalized at the time of your Discharge, you can call the ED Lab Result RN at [phone]. You will be contacted by this team for any positive Lab results or changes in treatment. The nurses are available 7 days a week from 10A to 6:30P.  You can leave a message 24 hours per day and they will return your call.        Test Results From Your Hospital Stay        8/14/2018  2:21 PM      Component Results     Component Value Ref Range & Units Status    Sodium 136 133 - 144 mmol/L Final    Potassium 4.0 3.4 - 5.3 mmol/L Final    Chloride 103 94 - 109 mmol/L Final    Carbon Dioxide 26 20 - 32 mmol/L Final    Anion Gap 7 3 - 14 mmol/L Final    Glucose 92 70 - 99 mg/dL Final     Urea Nitrogen 11 7 - 30 mg/dL Final    Creatinine 0.82 0.52 - 1.04 mg/dL Final    GFR Estimate 89 >60 mL/min/1.7m2 Final    Non  GFR Calc    GFR Estimate If Black >90 >60 mL/min/1.7m2 Final    African American GFR Calc    Calcium 9.0 8.5 - 10.1 mg/dL Final    Bilirubin Total 0.7 0.2 - 1.3 mg/dL Final    Albumin 4.1 3.4 - 5.0 g/dL Final    Protein Total 7.8 6.8 - 8.8 g/dL Final    Alkaline Phosphatase 75 40 - 150 U/L Final    ALT 31 0 - 50 U/L Final    AST 23 0 - 45 U/L Final         8/14/2018  2:18 PM      Component Results     Component Value Ref Range & Units Status    WBC 4.7 4.0 - 11.0 10e9/L Final    RBC Count 4.52 3.8 - 5.2 10e12/L Final    Hemoglobin 14.0 11.7 - 15.7 g/dL Final    Hematocrit 41.6 35.0 - 47.0 % Final    MCV 92 78 - 100 fl Final    MCH 31.0 26.5 - 33.0 pg Final    MCHC 33.7 31.5 - 36.5 g/dL Final    RDW 13.3 10.0 - 15.0 % Final    Platelet Count 102 (L) 150 - 450 10e9/L Final    Diff Method Automated Method  Final    % Neutrophils 66.6 % Final    % Lymphocytes 18.1 % Final    % Monocytes 13.9 % Final    % Eosinophils 0.2 % Final    % Basophils 0.6 % Final    % Immature Granulocytes 0.6 % Final    Nucleated RBCs 0 0 /100 Final    Absolute Neutrophil 3.1 1.6 - 8.3 10e9/L Final    Absolute Lymphocytes 0.9 0.8 - 5.3 10e9/L Final    Absolute Monocytes 0.7 0.0 - 1.3 10e9/L Final    Absolute Eosinophils 0.0 0.0 - 0.7 10e9/L Final    Absolute Basophils 0.0 0.0 - 0.2 10e9/L Final    Abs Immature Granulocytes 0.0 0 - 0.4 10e9/L Final    Absolute Nucleated RBC 0.0  Final         8/14/2018  2:04 PM      Component Results     Component Value Ref Range & Units Status    Lactate for Sepsis Protocol 1.4 0.7 - 2.0 mmol/L Final         8/14/2018  3:37 PM      Component Results     Component Value Ref Range & Units Status    HCG Qual Urine Negative NEG^Negative Final    This test is for screening purposes.  Results should be interpreted along with   the clinical picture.  Confirmation testing  "is available if warranted by   ordering [id number], HCG Quantitative Pregnancy.           2018  2:34 PM      Narrative     CHEST TWO VIEWS 2018 2:27 PM     HISTORY: Fever, cough, shortness of breath.     COMPARISON: None.        Impression     IMPRESSION: Mild patchy infiltrate right upper lobe. The remainder of  the lungs are clear and the heart size is normal.                Thank you for choosing [facility]       Thank you for choosing [facility] for your care. Our goal is always to provide you with excellent care. Hearing back from our patients is one way we can continue to improve our services. Please take a few minutes to complete the written survey that you may receive in the mail after you visit with us. Thank you!        [email or web address] Information     [facility] [employer] [facility] lets you send messages to your doctor, view your test results, renew your prescriptions, schedule appointments and more. To sign up, go to [email or web address] . Click on \"Log in\" on the left side of the screen, which will take you to the Welcome page. Then click on \"Sign up Now\" on the right side of the page.     You will be asked to enter the access code listed below, as well as some personal information. Please follow the directions to create your username and password.     Your access code is: [email or web address]  Expires: 2018  3:48 PM     Your access code will  in 90 days. If you need help or a new code, please call your [facility] clinic or [phone].        Care EveryWhere ID     This is your Care EveryWhere ID. This could be used by other organizations to access your [facility] medical records  [id number]        Equal Access to Services     SINDHU BROCK : Hadii roxann blue Soluzma, waaxda luqadaha, qaybta kaalmada kan, lane bui. So [facility] [phone].    ATENCIÓN: Si habla español, tiene a ludwig disposición servicios gratuitos de asistencia lingüística. Llame al [phone].    We comply with " applicable federal civil rights laws and [place] laws. We do not discriminate on the basis of race, color, national origin, age, disability, sex, sexual orientation, or gender identity.            After Visit Summary       This is your record. Keep this with you and show to your community pharmacist(s) and doctor(s) at your next visit.

## 2018-08-14 NOTE — ED PROVIDER NOTES
History     Chief Complaint   Patient presents with     Shortness of Breath     seen here sunday for URI. now difficulty breathing     HPI  Anna Hosler is a 22 year old female who cough and shortness of breath.  Symptoms have been getting worse over the past 4 days.  She has cough, nonproductive, sore throat, mild bilateral ear pain, body aches, joint aches.  She says she has been using acetaminophen without improvement.  No nausea or vomiting.  She is complaining of some mild achy abdominal pain diffusely, denies dysuria or urinary frequency.  She feels weak and run down.  She is still nursing but denies breast pain.  She reports ongoing very light vaginal bleeding without discharge.  The bleeding has been ongoing since her delivery 4 months ago.  She denies diarrhea or rash.    Problem List:    Patient Active Problem List    Diagnosis Date Noted     Pregnancy 04/06/2018     Priority: Medium     Encounter for triage in pregnant patient 02/02/2018     Priority: Medium     Irritable bowel syndrome 09/17/2013     Priority: Medium     Constipation, chronic 09/17/2013     Priority: Medium        Past Medical History:    No past medical history on file.    Past Surgical History:    No past surgical history on file.    Family History:    No family history on file.    Social History:  Marital Status:  Single [1]  Social History   Substance Use Topics     Smoking status: Never Smoker     Smokeless tobacco: Never Used     Alcohol use No        Medications:      Acetaminophen (TYLENOL PO)   azithromycin (ZITHROMAX) 250 MG tablet   clindamycin (CLINDAMAX) 1 % topical gel   fluticasone (FLONASE) 50 MCG/ACT spray   Prenatal Vit-Fe Fumarate-FA (PRENATAL MULTIVITAMIN PLUS IRON) 27-0.8 MG TABS per tablet         Review of Systems  Pertinent positives and negatives listed in the HPI, all other systems reviewed and are negative.    Physical Exam   BP: 99/68  Pulse: 138  Temp: 104.4  F (40.2  C)  Resp: 22  SpO2: 99 %      Physical  Exam   Constitutional: She is oriented to person, place, and time. She appears well-developed and well-nourished. She appears distressed.   HENT:   Head: Normocephalic and atraumatic.   Right Ear: Tympanic membrane and external ear normal.   Left Ear: Tympanic membrane and external ear normal.   Nose: Nose normal.   Mouth/Throat: No trismus in the jaw. No oropharyngeal exudate, posterior oropharyngeal edema, posterior oropharyngeal erythema or tonsillar abscesses.   Eyes: Conjunctivae are normal. No scleral icterus.   Neck: Normal range of motion.   Cardiovascular: Regular rhythm.  Tachycardia present.    Pulmonary/Chest: Effort normal. No stridor. No respiratory distress. She has no wheezes. She has no rales.   Abdominal: Soft. She exhibits no distension. There is no tenderness. There is no rebound and no guarding.   Lymphadenopathy:     She has cervical adenopathy.   Neurological: She is alert and oriented to person, place, and time.   Skin: Skin is warm and dry. She is not diaphoretic.   Psychiatric: She has a normal mood and affect. Her behavior is normal.   Nursing note and vitals reviewed.      ED Course     ED Course     Procedures               Critical Care time:  none               Results for orders placed or performed during the hospital encounter of 08/14/18 (from the past 24 hour(s))   Comprehensive metabolic panel   Result Value Ref Range    Sodium 136 133 - 144 mmol/L    Potassium 4.0 3.4 - 5.3 mmol/L    Chloride 103 94 - 109 mmol/L    Carbon Dioxide 26 20 - 32 mmol/L    Anion Gap 7 3 - 14 mmol/L    Glucose 92 70 - 99 mg/dL    Urea Nitrogen 11 7 - 30 mg/dL    Creatinine 0.82 0.52 - 1.04 mg/dL    GFR Estimate 89 >60 mL/min/1.7m2    GFR Estimate If Black >90 >60 mL/min/1.7m2    Calcium 9.0 8.5 - 10.1 mg/dL    Bilirubin Total 0.7 0.2 - 1.3 mg/dL    Albumin 4.1 3.4 - 5.0 g/dL    Protein Total 7.8 6.8 - 8.8 g/dL    Alkaline Phosphatase 75 40 - 150 U/L    ALT 31 0 - 50 U/L    AST 23 0 - 45 U/L   CBC with  platelets differential   Result Value Ref Range    WBC 4.7 4.0 - 11.0 10e9/L    RBC Count 4.52 3.8 - 5.2 10e12/L    Hemoglobin 14.0 11.7 - 15.7 g/dL    Hematocrit 41.6 35.0 - 47.0 %    MCV 92 78 - 100 fl    MCH 31.0 26.5 - 33.0 pg    MCHC 33.7 31.5 - 36.5 g/dL    RDW 13.3 10.0 - 15.0 %    Platelet Count 102 (L) 150 - 450 10e9/L    Diff Method Automated Method     % Neutrophils 66.6 %    % Lymphocytes 18.1 %    % Monocytes 13.9 %    % Eosinophils 0.2 %    % Basophils 0.6 %    % Immature Granulocytes 0.6 %    Nucleated RBCs 0 0 /100    Absolute Neutrophil 3.1 1.6 - 8.3 10e9/L    Absolute Lymphocytes 0.9 0.8 - 5.3 10e9/L    Absolute Monocytes 0.7 0.0 - 1.3 10e9/L    Absolute Eosinophils 0.0 0.0 - 0.7 10e9/L    Absolute Basophils 0.0 0.0 - 0.2 10e9/L    Abs Immature Granulocytes 0.0 0 - 0.4 10e9/L    Absolute Nucleated RBC 0.0    Lactate for Sepsis Protocol   Result Value Ref Range    Lactate for Sepsis Protocol 1.4 0.7 - 2.0 mmol/L   XR Chest 2 Views    Narrative    CHEST TWO VIEWS 8/14/2018 2:27 PM     HISTORY: Fever, cough, shortness of breath.     COMPARISON: None.      Impression    IMPRESSION: Mild patchy infiltrate right upper lobe. The remainder of  the lungs are clear and the heart size is normal.   HCG qualitative urine   Result Value Ref Range    HCG Qual Urine Negative NEG^Negative       Medications   lidocaine 1 % 1 mL (not administered)   lidocaine (LMX4) kit (not administered)   sodium chloride (PF) 0.9% PF flush 3 mL (not administered)   sodium chloride (PF) 0.9% PF flush 3 mL (not administered)   0.9% sodium chloride BOLUS (0 mLs Intravenous Stopped 8/14/18 1605)   ibuprofen (ADVIL/MOTRIN) tablet 800 mg (800 mg Oral Given 8/14/18 1404)       Assessments & Plan (with Medical Decision Making)   22-year-old female who presents for cough, shortness of breath, body aches.  Temperature is 40.2 C, heart rate 38, blood pressure 99/68, SPO2 is 99% on room air.  She is ill-appearing but nontoxic.  She is given  ibuprofen for symptoms.  IV access is obtained and IV fluids are given.  No signs of retropharyngeal abscess on exam, no signs of otitis media on exam.  Chest x-ray obtained, images reviewed independently as well as radiology read reviewed, positive for right upper lobe infiltrate consistent with pneumonia.  Lactic acid is normal.  Blood cell count normal.  Electrolytes are within normal limits.  Creatinine and BUN are normal.  Urine pregnancy test is negative.  On recheck the patient is feeling better, tolerating oral intake, breathing comfortably on room air, and is safe to discharge home with instructions to return if she has worsening symptoms or concerns.  She has an allergy to penicillins and is breast-feeding, therefore I will treat with azithromycin.  She is told return if she has worsening symptoms, otherwise follow-up in clinic if not improved in 3-4 days.  The patient is in agreement with this plan.    I have reviewed the nursing notes.    I have reviewed the findings, diagnosis, plan and need for follow up with the patient.       New Prescriptions    AZITHROMYCIN (ZITHROMAX) 250 MG TABLET    Two tablets first day, then one tablet daily for four days.       Final diagnoses:   Community acquired pneumonia of right upper lobe of lung (H)       8/14/2018   [facility] EMERGENCY DEPARTMENT     Preet Walker MD  08/14/18 7875

## 2018-08-14 NOTE — ED AVS SNAPSHOT
[facility] Emergency Department    [address] [facility] [address] [address]    Phone:  [phone]    Fax:  [phone]                                       Anna Hosler   MRN: [id number]    Department:  [facility] Emergency Department   Date of Visit:  8/14/2018           After Visit Summary Signature Page     I have received my discharge instructions, and my questions have been answered. I have discussed any challenges I see with this plan with the nurse or doctor.    ..........................................................................................................................................  Patient/Patient Representative Signature      ..........................................................................................................................................  Patient Representative Print Name and Relationship to Patient    ..................................................               ................................................  Date                                            Time    ..........................................................................................................................................  Reviewed by Signature/Title    ...................................................              ..............................................  Date                                                            Time

## 2018-12-05 ENCOUNTER — APPOINTMENT (OUTPATIENT)
Dept: GENERAL RADIOLOGY | Facility: CLINIC | Age: 23
End: 2018-12-05
Attending: NURSE PRACTITIONER
Payer: COMMERCIAL

## 2018-12-05 ENCOUNTER — HOSPITAL ENCOUNTER (EMERGENCY)
Facility: CLINIC | Age: 23
Discharge: HOME | End: 2018-12-05
Attending: NURSE PRACTITIONER | Admitting: NURSE PRACTITIONER
Payer: COMMERCIAL

## 2018-12-05 VITALS
SYSTOLIC BLOOD PRESSURE: 116 MMHG | BODY MASS INDEX: 25.02 KG/M2 | TEMPERATURE: 97.9 F | DIASTOLIC BLOOD PRESSURE: 81 MMHG | HEIGHT: 66 IN | RESPIRATION RATE: 16 BRPM | OXYGEN SATURATION: 99 %

## 2018-12-05 DIAGNOSIS — J06.9 VIRAL URI WITH COUGH: ICD-10-CM

## 2018-12-05 PROCEDURE — G0463 HOSPITAL OUTPT CLINIC VISIT: HCPCS | Mod: 25

## 2018-12-05 PROCEDURE — 71046 X-RAY EXAM CHEST 2 VIEWS: CPT

## 2018-12-05 PROCEDURE — 99213 OFFICE O/P EST LOW 20 MIN: CPT | Performed by: NURSE PRACTITIONER

## 2018-12-05 ASSESSMENT — ENCOUNTER SYMPTOMS
DIARRHEA: 0
CHEST TIGHTNESS: 1
LIGHT-HEADEDNESS: 0
HEADACHES: 0
APPETITE CHANGE: 0
CHILLS: 0
RHINORRHEA: 1
SINUS PRESSURE: 1
ABDOMINAL PAIN: 1
SORE THROAT: 0
WEAKNESS: 0
SHORTNESS OF BREATH: 0
DIZZINESS: 1
NAUSEA: 0
COUGH: 1
FEVER: 0
FATIGUE: 0
VOMITING: 0

## 2018-12-05 NOTE — ED AVS SNAPSHOT
[facility] Emergency Department    [address] [facility] [address] [address]    Phone:  [phone]    Fax:  [phone]                                       Anna Hosler   MRN: [id number]    Department:  [facility] Emergency Department   Date of Visit:  12/5/2018           After Visit Summary Signature Page     I have received my discharge instructions, and my questions have been answered. I have discussed any challenges I see with this plan with the nurse or doctor.    ..........................................................................................................................................  Patient/Patient Representative Signature      ..........................................................................................................................................  Patient Representative Print Name and Relationship to Patient    ..................................................               ................................................  Date                                   Time    ..........................................................................................................................................  Reviewed by Signature/Title    ...................................................              ..............................................  Date                                               Time          22EPIC Rev 08/18

## 2018-12-05 NOTE — ED AVS SNAPSHOT
[facility] Emergency Department    [address]    [facility] [address]    Phone:  [phone]    Fax:  [phone]                                       Anna Hosler   MRN: [id number]    Department:  [facility] Emergency Department   Date of Visit:  12/5/2018           Patient Information     Date Of Birth          1995        Your diagnoses for this visit were:     Viral URI with cough        You were seen by Winnie Caicedo APRN CNP.      Follow-up Information     Follow up with Clinic, [facility].    Why:  As needed    Contact information:    [address] [place] [address]  [phone]          Discharge Instructions         Viral Upper Respiratory Illness (Adult)  You have a viral upper respiratory illness (URI), which is another term for the common cold. This illness is contagious during the first few days. It is spread through the air by coughing and sneezing. It may also be spread by direct contact (touching the sick person and then touching your own eyes, nose, or mouth). Frequent handwashing will decrease risk of spread. Most viral illnesses go away within 7 to 10 days with rest and simple home remedies. Sometimes the illness may last for several weeks. Antibiotics will not kill a virus, and they are generally not prescribed for this condition.    Home care    If symptoms are severe, rest at home for the first 2 to 3 days. When you resume activity, don't let yourself get too tired.    Don't smoke. If you need help stopping, talk with your healthcare provider.    Avoid being exposed to cigarette smoke (yours or others ).    You may use acetaminophen or ibuprofen to control pain and fever, unless another medicine was prescribed. If you have chronic liver or kidney disease, have ever had a stomach ulcer or gastrointestinal bleeding, or are taking blood-thinning medicines, talk with your healthcare provider before using these medicines. Aspirin should never be  given to anyone under 18 years of age who is ill with a viral infection or fever. It may cause severe liver or brain damage.    Your appetite may be poor, so a light diet is fine. Stay well hydrated by drinking 6 to 8 glasses of fluids per day (water, soft drinks, juices, tea, or soup). Extra fluids will help loosen secretions in the nose and lungs.    Over-the-counter cold medicines will not shorten the length of time you re sick, but they may be helpful for the following symptoms: cough, sore throat, and nasal and sinus congestion. If you take prescription medicines, ask your healthcare provider or pharmacist which over-the-counter medicines are safe to use. (Note: Don't use decongestants if you have high blood pressure.)  Follow-up care  Follow up with your healthcare provider, or as advised.  When to seek medical advice  Call your healthcare provider right away if any of these occur:    Cough with lots of colored sputum (mucus)    Severe headache; face, neck, or ear pain    Difficulty swallowing due to throat pain    Fever of 100.4 F (38 C) or higher, or as directed by your healthcare provider  Call 911  Call 911 if any of these occur:    Chest pain, shortness of breath, wheezing, or difficulty breathing    Coughing up blood    Very severe pain with swallowing, especially if it goes along with a muffled voice   Date Last Reviewed: 6/1/2018 2000-2018 [employer]. [address] [place] [address]. All rights reserved. This information is not intended as a substitute for professional medical care. Always follow your healthcare professional's instructions.          24 Hour Appointment Hotline       To make an appointment at any [facility], call [phone] ([phone]). If you don't have a family doctor or clinic, we will help you find one. [facility] clinics are conveniently located to serve the needs of you and your family.             Review of your medicines      Our records  show that you are taking the medicines listed below. If these are incorrect, please call your family doctor or clinic.        Dose / Directions Last dose taken    azithromycin 250 MG tablet   Commonly known as:  ZITHROMAX   Quantity:  6 tablet        Two tablets first day, then one tablet daily for four days.   Refills:  0        clindamycin 1 % external gel   Commonly known as:  CLINDAMAX        Apply topically 2 times daily To face   Refills:  0        fluticasone 50 MCG/ACT nasal spray   Commonly known as:  FLONASE   Dose:  2 spray        Spray 2 sprays in nostril daily   Refills:  0        prenatal multivitamin w/iron 27-0.8 MG tablet   Dose:  1 tablet        Take 1 tablet by mouth daily   Refills:  0        TYLENOL PO   Dose:  500 mg        Take 500 mg by mouth every 6 hours as needed for mild pain or fever   Refills:  0                Procedures and tests performed during your visit     XR Chest 2 Views      Orders Needing Specimen Collection     None      Pending Results     Date and Time Order Name Status Description    12/5/2018 1821 XR Chest 2 Views Preliminary             Pending Culture Results     No orders found from 12/3/2018 to 12/6/2018.            Pending Results Instructions     If you had any lab results that were not finalized at the time of your Discharge, you can call the ED Lab Result RN at [phone]. You will be contacted by this team for any positive Lab results or changes in treatment. The nurses are available 7 days a week from 10A to 6:30P.  You can leave a message 24 hours per day and they will return your call.        Test Results From Your Hospital Stay        12/5/2018  6:40 PM      Narrative     CHEST TWO VIEWS  12/5/2018 6:31 PM     HISTORY: Cough.    COMPARISON: 8/14/2018        Impression     IMPRESSION: Normal. Resolution of previous right upper lobe  infiltrate.                Thank you for choosing [facility]       Thank you for choosing [facility] for your care. Our goal is  "always to provide you with excellent care. Hearing back from our patients is one way we can continue to improve our services. Please take a few minutes to complete the written survey that you may receive in the mail after you visit with us. Thank you!        Overblog Information     Overblog lets you send messages to your doctor, view your test results, renew your prescriptions, schedule appointments and more. To sign up, go to [email or web address] . Click on \"Log in\" on the left side of the screen, which will take you to the Welcome page. Then click on \"Sign up Now\" on the right side of the page.     You will be asked to enter the access code listed below, as well as some personal information. Please follow the directions to create your username and password.     Your access code is: [email or web address]  Expires: 3/5/2019  6:51 PM     Your access code will  in 90 days. If you need help or a new code, please call your [facility] clinic or [phone].        Care EveryWhere ID     This is your Care EveryWhere ID. This could be used by other organizations to access your [facility] medical records  [id number]        Equal Access to Services     SINDHU BROCK : Brenda Will, washeila dugan, qamarion omer, lane bui. So [facility] [phone].    ATENCIÓN: Si habla español, tiene a ludwig disposición servicios gratuitos de asistencia lingüística. Tai al [phone].    We comply with applicable federal civil rights laws and [place] laws. We do not discriminate on the basis of race, color, national origin, age, disability, sex, sexual orientation, or gender identity.            After Visit Summary       This is your record. Keep this with you and show to your community pharmacist(s) and doctor(s) at your next visit.                  "

## 2018-12-06 NOTE — ED PROVIDER NOTES
History     Chief Complaint   Patient presents with     Cough     and congestion     HPI  Anna Hosler is a 23 year old female who presents to urgent care for evaluation of cough and congestion.  Symptoms started 2 days ago.  Patient reports chest pressure.  Patient states symptoms feel similar to when she had pneumonia in August.  Denies shortness of breath.  Does report sinus congestion, pressure, and intermittent dizziness.  Patient states she has abdominal pain that comes and goes.  No vomiting or diarrhea.  No significant fever or chills.      Problem List:    Patient Active Problem List    Diagnosis Date Noted     Pregnancy 04/06/2018     Priority: Medium     Encounter for triage in pregnant patient 02/02/2018     Priority: Medium     Irritable bowel syndrome 09/17/2013     Priority: Medium     Constipation, chronic 09/17/2013     Priority: Medium        Past Medical History:    History reviewed. No pertinent past medical history.    Past Surgical History:    History reviewed. No pertinent surgical history.    Family History:    No family history on file.    Social History:  Marital Status:  Single [1]  Social History   Substance Use Topics     Smoking status: Never Smoker     Smokeless tobacco: Never Used     Alcohol use No        No current facility-administered medications on file prior to encounter.   Current Outpatient Prescriptions on File Prior to Encounter:  Acetaminophen (TYLENOL PO) Take 500 mg by mouth every 6 hours as needed for mild pain or fever   azithromycin (ZITHROMAX) 250 MG tablet Two tablets first day, then one tablet daily for four days.   clindamycin (CLINDAMAX) 1 % topical gel Apply topically 2 times daily To face   fluticasone (FLONASE) 50 MCG/ACT spray Spray 2 sprays in nostril daily   Prenatal Vit-Fe Fumarate-FA (PRENATAL MULTIVITAMIN PLUS IRON) 27-0.8 MG TABS per tablet Take 1 tablet by mouth daily         Review of Systems   Constitutional: Negative for appetite change, chills,  "fatigue and fever.   HENT: Positive for congestion, rhinorrhea and sinus pressure. Negative for ear pain and sore throat.    Respiratory: Positive for cough and chest tightness. Negative for shortness of breath.    Cardiovascular: Negative for chest pain.   Gastrointestinal: Positive for abdominal pain. Negative for diarrhea, nausea and vomiting.   Neurological: Positive for dizziness. Negative for weakness, light-headedness and headaches.       Physical Exam   BP: 116/81  Heart Rate: 67  Temp: 97.9  F (36.6  C)  Resp: 16  Height: 167.6 cm (5' 6\")  SpO2: 99 %      Physical Exam    GENERAL APPEARANCE: healthy, alert and no acute distress.  EYES: EOMI, conjunctiva clear  HENT: bilateral ear canals clear, intact, and without inflammation. Right TM normal. Left TM normal. Nose normal.  Oropharynx without ulcers, erythema or lesions  NECK: supple, nontender, no lymphadenopathy  RESP: lungs clear to auscultation - no rales, rhonchi or wheezes  CV: regular rates and rhythm, normal S1 S2, no murmur noted        ED Course     ED Course     Procedures               Results for orders placed or performed during the hospital encounter of 12/05/18 (from the past 24 hour(s))   XR Chest 2 Views    Narrative    CHEST TWO VIEWS  12/5/2018 6:31 PM     HISTORY: Cough.    COMPARISON: 8/14/2018      Impression    IMPRESSION: Normal. Resolution of previous right upper lobe  infiltrate.       Medications - No data to display    Assessments & Plan (with Medical Decision Making)   23-year-old healthy female who presents with 2-day history of URI symptoms.  Afebrile.  Normotensive.  Lung sounds are CTA.  No tachypnea.  No increased work of breathing.  SPO2 99% on room air.  Normotensive.  No tachycardia.  I suspect this is a viral course of illness, however, patient is worried that her symptoms feel like pneumonia which she had over the summer so I obtained a chest x-ray.  Chest x-ray is normal without evidence for infiltrate or " consolidation.  Reassurance provided the patient.  Patient instructed to continue over-the-counter symptomatic treatment.  Stay well-hydrated.  Worrisome reasons to recheck discussed.    I have reviewed the nursing notes.    I have reviewed the findings, diagnosis, plan and need for follow up with the patient.      New Prescriptions    No medications on file       Final diagnoses:   Viral URI with cough       12/5/2018   [facility] EMERGENCY DEPARTMENT     Winnie Caicedo APRN CNP  12/05/18 [id number]

## 2018-12-06 NOTE — DISCHARGE INSTRUCTIONS
Viral Upper Respiratory Illness (Adult)  You have a viral upper respiratory illness (URI), which is another term for the common cold. This illness is contagious during the first few days. It is spread through the air by coughing and sneezing. It may also be spread by direct contact (touching the sick person and then touching your own eyes, nose, or mouth). Frequent handwashing will decrease risk of spread. Most viral illnesses go away within 7 to 10 days with rest and simple home remedies. Sometimes the illness may last for several weeks. Antibiotics will not kill a virus, and they are generally not prescribed for this condition.    Home care    If symptoms are severe, rest at home for the first 2 to 3 days. When you resume activity, don't let yourself get too tired.    Don't smoke. If you need help stopping, talk with your healthcare provider.    Avoid being exposed to cigarette smoke (yours or others ).    You may use acetaminophen or ibuprofen to control pain and fever, unless another medicine was prescribed. If you have chronic liver or kidney disease, have ever had a stomach ulcer or gastrointestinal bleeding, or are taking blood-thinning medicines, talk with your healthcare provider before using these medicines. Aspirin should never be given to anyone under 18 years of age who is ill with a viral infection or fever. It may cause severe liver or brain damage.    Your appetite may be poor, so a light diet is fine. Stay well hydrated by drinking 6 to 8 glasses of fluids per day (water, soft drinks, juices, tea, or soup). Extra fluids will help loosen secretions in the nose and lungs.    Over-the-counter cold medicines will not shorten the length of time you re sick, but they may be helpful for the following symptoms: cough, sore throat, and nasal and sinus congestion. If you take prescription medicines, ask your healthcare provider or pharmacist which over-the-counter medicines are safe to use. (Note: Don't use  decongestants if you have high blood pressure.)  Follow-up care  Follow up with your healthcare provider, or as advised.  When to seek medical advice  Call your healthcare provider right away if any of these occur:    Cough with lots of colored sputum (mucus)    Severe headache; face, neck, or ear pain    Difficulty swallowing due to throat pain    Fever of 100.4 F (38 C) or higher, or as directed by your healthcare provider  Call 911  Call 911 if any of these occur:    Chest pain, shortness of breath, wheezing, or difficulty breathing    Coughing up blood    Very severe pain with swallowing, especially if it goes along with a muffled voice   Date Last Reviewed: 6/1/2018 2000-2018 [employer]. [address] [place] [address], [place] [address]. All rights reserved. This information is not intended as a substitute for professional medical care. Always follow your healthcare professional's instructions.

## 2019-05-10 ENCOUNTER — HOSPITAL ENCOUNTER (EMERGENCY)
Facility: CLINIC | Age: 24
Discharge: HOME | End: 2019-05-10
Attending: FAMILY MEDICINE | Admitting: FAMILY MEDICINE
Payer: COMMERCIAL

## 2019-05-10 VITALS
DIASTOLIC BLOOD PRESSURE: 65 MMHG | RESPIRATION RATE: 16 BRPM | WEIGHT: 133 LBS | BODY MASS INDEX: 21.38 KG/M2 | OXYGEN SATURATION: 100 % | SYSTOLIC BLOOD PRESSURE: 108 MMHG | TEMPERATURE: 98.7 F | HEIGHT: 66 IN | HEART RATE: 102 BPM

## 2019-05-10 DIAGNOSIS — J01.90 ACUTE SINUSITIS WITH SYMPTOMS > 10 DAYS: ICD-10-CM

## 2019-05-10 PROCEDURE — 99283 EMERGENCY DEPT VISIT LOW MDM: CPT | Mod: Z6 | Performed by: FAMILY MEDICINE

## 2019-05-10 PROCEDURE — 99283 EMERGENCY DEPT VISIT LOW MDM: CPT

## 2019-05-10 RX ORDER — CEFPROZIL 500 MG/1
500 TABLET, FILM COATED ORAL 2 TIMES DAILY
Qty: 20 TABLET | Refills: 0 | Status: SHIPPED | OUTPATIENT
Start: 2019-05-10

## 2019-05-10 ASSESSMENT — ENCOUNTER SYMPTOMS
SHORTNESS OF BREATH: 1
DIAPHORESIS: 0
NAUSEA: 0
DYSURIA: 0
SINUS PRESSURE: 1
COUGH: 1
SORE THROAT: 0
DIARRHEA: 0
BLOOD IN STOOL: 0
FREQUENCY: 0
CHILLS: 0
VOMITING: 0
WHEEZING: 0
CONSTIPATION: 0
ABDOMINAL PAIN: 1
HEADACHES: 0
FEVER: 1
PALPITATIONS: 0

## 2019-05-10 ASSESSMENT — MIFFLIN-ST. JEOR: SCORE: 1385.03

## 2019-05-10 NOTE — ED AVS SNAPSHOT
[facility] Emergency Department  [address] [address]  Phone:  [phone]  Fax:  [phone]                                    Anna Hosler   MRN: [id number]    Department:  [facility] Emergency Department   Date of Visit:  5/10/2019           After Visit Summary Signature Page    I have received my discharge instructions, and my questions have been answered. I have discussed any challenges I see with this plan with the nurse or doctor.    ..........................................................................................................................................  Patient/Patient Representative Signature      ..........................................................................................................................................  Patient Representative Print Name and Relationship to Patient    ..................................................               ................................................  Date                                   Time    ..........................................................................................................................................  Reviewed by Signature/Title    ...................................................              ..............................................  Date                                               Time          22EPIC Rev 08/18

## 2019-05-11 NOTE — DISCHARGE INSTRUCTIONS
ICD-10-CM    1. Acute sinusitis with symptoms > 10 days J01.90     use nasal saline in each nostril frequently. may use afrin nasal spray twice daily up to 3 days for facial pain.  Stay on flonase.  if persistent symptoms >14 days, worsening then start cefzil for 10 days for sinusitis.     Treating Upper Respiratory Infections and Sinus Infections    START WITH OVER-THE-COUNTER TOPICAL MEDICATIONS  Saline Nasal Spray (Deep Sea or Ocean)   Effective decongestant without any systemic side effects   Use this as frequently as every 10-15 minutes   Start use in the morning with 3 minutes of constant infusion    Oxymetazoline (Afrin)   Used twice daily for moderate to severe relief of sinus headache   Do not exceed 3 days of use       Prolonged use results in a chronic runny nose    CONSIDER ADDING OVER-THE-COUNTER ORAL MEDICATIONS  Guaifenesin (Robitussin, over-the-counter)   Guaifenesin is an expectorant that helps promote sinus drainage   Guaifenesin may cause a dry mouth sensation    Stay hydrated and consider sucking candies   Guaifenesin will increase coughing temporarily    Increases sinus drainage and is also a cough expectorant   Adult dosing is 400 mg of the short-acting Guaifenesin every 4 hours   Mucinex (12 hour preparation of high dose Guaifenesin alone)    Ibuprofen (Advil, Motrin)   Helps relieve the sinus headache, lower fever, relieve muscle aches   Adults may use 600 mg orally every 6 hours as needed with food   Children should not exceed 10 mg per kilogram every 6 hours    ANTIBIOTICS   Upper respiratory infections nearly always start as viral infections   Bacteria do not super-infect sinuses until after 10-14 days   Antibiotics are only indicated if symptoms last more than 10-14 days    THE SEASONAL ALLERGY TO SINUS INFECTION CONNECTION  Frequent sinus infections are often due to underlying seasonal allergies  Allergy control may prevent sinus infections      Consider starting a nasal steroid  inhaler (prescription only)  Antihistamines may dry-up sinus drainage and worsen sinusitis       Avoid antihistamines unless sinusitus is unlikely

## 2019-05-11 NOTE — ED NOTES
Pt here with cough for over 1 week, getting worse today. Also having ear aches, worse on the left for the past 2 days. Had pneumonia last fall and is concerned that she has it again.

## 2019-05-11 NOTE — ED PROVIDER NOTES
"  History     Chief Complaint   Patient presents with     Cough     started about 10 days ago     Sinusitis     HPI  Anna Hosler is a 23 year old female who  presents cough for 10 days - initially productive - but now dry. post-nasal drainage, bilateral ear pain. left ear \"echoes\".  no pharyngitis., facial/sinus pain. No asthma.  RUL pneumonia in sept 2018.  subsequent xray cleared.     occl sense of shortness of breath    denies pregnancy.  started on OCP in the last week. negative pregnancy test at that time.  Allergies:  Allergies   Allergen Reactions     Neomycin-Polymyxin-Hc Swelling     Eyes reddened and inflamed      Shellfish Allergy Hives     Shellfish and fish reaction as a child      Amoxicillin Rash       Problem List:    Patient Active Problem List    Diagnosis Date Noted     Pregnancy 04/06/2018     Priority: Medium     Encounter for triage in pregnant patient 02/02/2018     Priority: Medium     Irritable bowel syndrome 09/17/2013     Priority: Medium     Constipation, chronic 09/17/2013     Priority: Medium        Past Medical History:    No past medical history on file.    Past Surgical History:    No past surgical history on file.    Family History:    No family history on file.    Social History:  Marital Status:  Single [1]  Social History     Tobacco Use     Smoking status: Never Smoker     Smokeless tobacco: Never Used   Substance Use Topics     Alcohol use: No     Drug use: No        Medications:      Acetaminophen (TYLENOL PO)   azithromycin (ZITHROMAX) 250 MG tablet   clindamycin (CLINDAMAX) 1 % topical gel   fluticasone (FLONASE) 50 MCG/ACT spray   Prenatal Vit-Fe Fumarate-FA (PRENATAL MULTIVITAMIN PLUS IRON) 27-0.8 MG TABS per tablet         Review of Systems   Constitutional: Positive for fever. Negative for chills and diaphoresis.   HENT: Positive for postnasal drip and sinus pressure. Negative for ear pain and sore throat.    Eyes: Negative for visual disturbance.   Respiratory: Positive " "for cough and shortness of breath. Negative for wheezing.    Cardiovascular: Negative for chest pain and palpitations.   Gastrointestinal: Positive for abdominal pain (lower abd - periodic. mild). Negative for blood in stool, constipation, diarrhea, nausea and vomiting.   Genitourinary: Negative for dysuria, frequency and urgency.   Skin: Negative for rash.   Neurological: Negative for headaches.   All other systems reviewed and are negative.      Physical Exam   BP: 102/69  Pulse: 100  Temp: 98.7  F (37.1  C)  Resp: 16  Height: 167.6 cm (5' 6\")  Weight: 60.3 kg (133 lb)(stated)  SpO2: 100 %      Physical Exam   Constitutional: No distress.   HENT:   Mouth/Throat: Oropharynx is clear and moist.   Eyes: Conjunctivae are normal.   Neck: Neck supple.   Cardiovascular: Normal rate, regular rhythm and normal heart sounds. Exam reveals no friction rub.   No murmur heard.  Pulmonary/Chest: Effort normal and breath sounds normal. No stridor. No respiratory distress. She has no wheezes.   Abdominal: Soft. Bowel sounds are normal. She exhibits no distension. There is no tenderness. There is no guarding.   Musculoskeletal: She exhibits no edema.   Neurological: She exhibits normal muscle tone.   Skin: No rash noted. She is not diaphoretic. No pallor.     The right ear demonstrates mild injection and decreased mobility.  Good light reflex.  Sinuses are tender to percussion over the maxillary sinuses    ED Course        Procedures               Critical Care time:  none               No results found for this or any previous visit (from the past 24 hour(s)).    Medications - No data to display    Assessments & Plan (with Medical Decision Making)     MDM: Anna Hosler is a 23 year old female presented with upper respiratory infection for the last 10 days.  Primarily upper respiratory drainage, postnasal drainage, facial pain.  She was concerned regarding her history of pneumonia that occurred in September 2018 but she has no " significant lower respiratory findings, normal oxygen saturations respiratory rate and lung exam.  We discussed treatment for sinusitis initially with nasal saline, and other measures as described in discharge paperwork and if persistent symptoms greater than 14 days to me she may start antibiotics as described below.  Precautions given for return.      I have reviewed the nursing notes.    I have reviewed the findings, diagnosis, plan and need for follow up with the patient.          Medication List      There are no discharge medications for this visit.         Final diagnoses:   Acute sinusitis with symptoms > 10 days - use nasal saline in each nostril frequently. may use afrin nasal spray twice daily up to 3 days for facial pain.  Stay on flonase.  if persistent symptoms >14 days, worsening then start cefzil for 10 days for sinusitis.       5/10/2019   [facility] EMERGENCY DEPARTMENT     Andre Garcia MD  05/10/19 [id number]

## 2021-05-29 ENCOUNTER — RECORDS - HEALTHEAST (OUTPATIENT)
Dept: ADMINISTRATIVE | Facility: CLINIC | Age: 26
End: 2021-05-29

## (undated) RX ORDER — BUPIVACAINE HYDROCHLORIDE 2.5 MG/ML
INJECTION, SOLUTION EPIDURAL; INFILTRATION; INTRACAUDAL
Status: DISPENSED
Start: 2018-04-18